# Patient Record
Sex: FEMALE | Race: WHITE | NOT HISPANIC OR LATINO | Employment: OTHER | URBAN - METROPOLITAN AREA
[De-identification: names, ages, dates, MRNs, and addresses within clinical notes are randomized per-mention and may not be internally consistent; named-entity substitution may affect disease eponyms.]

---

## 2017-12-05 ENCOUNTER — HOSPITAL ENCOUNTER (OUTPATIENT)
Facility: CLINIC | Age: 72
End: 2017-12-05
Attending: COLON & RECTAL SURGERY | Admitting: COLON & RECTAL SURGERY
Payer: MEDICARE

## 2017-12-19 RX ORDER — ONDANSETRON 2 MG/ML
4 INJECTION INTRAMUSCULAR; INTRAVENOUS
Status: CANCELLED | OUTPATIENT
Start: 2017-12-19

## 2017-12-19 RX ORDER — LIDOCAINE 40 MG/G
CREAM TOPICAL
Status: CANCELLED | OUTPATIENT
Start: 2017-12-19

## 2019-02-18 ENCOUNTER — APPOINTMENT (OUTPATIENT)
Age: 74
Setting detail: DERMATOLOGY
End: 2019-03-04

## 2019-02-18 DIAGNOSIS — Z41.9 ENCOUNTER FOR PROCEDURE FOR PURPOSES OTHER THAN REMEDYING HEALTH STATE, UNSPECIFIED: ICD-10-CM

## 2019-02-18 PROCEDURE — OTHER COSMETIC CONSULTATION: BROWN SPOTS: OTHER

## 2019-02-18 ASSESSMENT — LOCATION ZONE DERM: LOCATION ZONE: FACE

## 2019-02-18 ASSESSMENT — LOCATION SIMPLE DESCRIPTION DERM: LOCATION SIMPLE: LEFT CHEEK

## 2019-02-18 ASSESSMENT — LOCATION DETAILED DESCRIPTION DERM: LOCATION DETAILED: LEFT CENTRAL MALAR CHEEK

## 2021-03-22 DIAGNOSIS — Z11.59 ENCOUNTER FOR SCREENING FOR OTHER VIRAL DISEASES: ICD-10-CM

## 2021-04-06 ENCOUNTER — HOSPITAL ENCOUNTER (OUTPATIENT)
Facility: CLINIC | Age: 76
Discharge: HOME | End: 2021-04-06
Attending: INTERNAL MEDICINE | Admitting: INTERNAL MEDICINE
Payer: COMMERCIAL

## 2021-04-06 ENCOUNTER — ANESTHESIA (OUTPATIENT)
Dept: GASTROENTEROLOGY | Facility: CLINIC | Age: 76
End: 2021-04-06
Payer: COMMERCIAL

## 2021-04-06 ENCOUNTER — ANESTHESIA EVENT (OUTPATIENT)
Dept: GASTROENTEROLOGY | Facility: CLINIC | Age: 76
End: 2021-04-06
Payer: COMMERCIAL

## 2021-04-06 VITALS
HEART RATE: 48 BPM | BODY MASS INDEX: 24.99 KG/M2 | SYSTOLIC BLOOD PRESSURE: 154 MMHG | DIASTOLIC BLOOD PRESSURE: 74 MMHG | HEIGHT: 65 IN | OXYGEN SATURATION: 99 % | RESPIRATION RATE: 16 BRPM | WEIGHT: 150 LBS

## 2021-04-06 DIAGNOSIS — K86.2 PANCREAS CYST: Primary | ICD-10-CM

## 2021-04-06 LAB
AMYLASE FLD-CCNC: NORMAL U/L
CEA FLD-MCNC: 130.2 UG/L
CEA FLD-MCNC: NORMAL NG/ML
UPPER EUS: NORMAL

## 2021-04-06 PROCEDURE — 43242 EGD US FINE NEEDLE BX/ASPIR: CPT | Performed by: INTERNAL MEDICINE

## 2021-04-06 PROCEDURE — 258N000003 HC RX IP 258 OP 636: Performed by: NURSE ANESTHETIST, CERTIFIED REGISTERED

## 2021-04-06 PROCEDURE — 250N000011 HC RX IP 250 OP 636: Performed by: INTERNAL MEDICINE

## 2021-04-06 PROCEDURE — 88108 CYTOPATH CONCENTRATE TECH: CPT | Mod: 26 | Performed by: PATHOLOGY

## 2021-04-06 PROCEDURE — 88313 SPECIAL STAINS GROUP 2: CPT | Mod: 26 | Performed by: PATHOLOGY

## 2021-04-06 PROCEDURE — 250N000009 HC RX 250: Performed by: NURSE ANESTHETIST, CERTIFIED REGISTERED

## 2021-04-06 PROCEDURE — 88313 SPECIAL STAINS GROUP 2: CPT | Mod: TC | Performed by: INTERNAL MEDICINE

## 2021-04-06 PROCEDURE — 88108 CYTOPATH CONCENTRATE TECH: CPT | Mod: TC | Performed by: INTERNAL MEDICINE

## 2021-04-06 PROCEDURE — 250N000011 HC RX IP 250 OP 636: Performed by: NURSE ANESTHETIST, CERTIFIED REGISTERED

## 2021-04-06 PROCEDURE — 370N000017 HC ANESTHESIA TECHNICAL FEE, PER MIN: Performed by: INTERNAL MEDICINE

## 2021-04-06 PROCEDURE — 82150 ASSAY OF AMYLASE: CPT | Performed by: INTERNAL MEDICINE

## 2021-04-06 PROCEDURE — 999N000010 HC STATISTIC ANES STAT CODE-CRNA PER MINUTE: Performed by: INTERNAL MEDICINE

## 2021-04-06 PROCEDURE — 82378 CARCINOEMBRYONIC ANTIGEN: CPT | Performed by: INTERNAL MEDICINE

## 2021-04-06 RX ORDER — FENTANYL CITRATE 50 UG/ML
25-50 INJECTION, SOLUTION INTRAMUSCULAR; INTRAVENOUS
Status: DISCONTINUED | OUTPATIENT
Start: 2021-04-06 | End: 2021-04-06 | Stop reason: HOSPADM

## 2021-04-06 RX ORDER — MEPERIDINE HYDROCHLORIDE 25 MG/ML
12.5 INJECTION INTRAMUSCULAR; INTRAVENOUS; SUBCUTANEOUS
Status: DISCONTINUED | OUTPATIENT
Start: 2021-04-06 | End: 2021-04-06 | Stop reason: HOSPADM

## 2021-04-06 RX ORDER — HYDROMORPHONE HYDROCHLORIDE 1 MG/ML
.3-.5 INJECTION, SOLUTION INTRAMUSCULAR; INTRAVENOUS; SUBCUTANEOUS EVERY 10 MIN PRN
Status: DISCONTINUED | OUTPATIENT
Start: 2021-04-06 | End: 2021-04-06 | Stop reason: HOSPADM

## 2021-04-06 RX ORDER — SODIUM CHLORIDE, SODIUM LACTATE, POTASSIUM CHLORIDE, CALCIUM CHLORIDE 600; 310; 30; 20 MG/100ML; MG/100ML; MG/100ML; MG/100ML
INJECTION, SOLUTION INTRAVENOUS CONTINUOUS
Status: DISCONTINUED | OUTPATIENT
Start: 2021-04-06 | End: 2021-04-06 | Stop reason: HOSPADM

## 2021-04-06 RX ORDER — NALOXONE HYDROCHLORIDE 0.4 MG/ML
0.2 INJECTION, SOLUTION INTRAMUSCULAR; INTRAVENOUS; SUBCUTANEOUS
Status: DISCONTINUED | OUTPATIENT
Start: 2021-04-06 | End: 2021-04-06 | Stop reason: HOSPADM

## 2021-04-06 RX ORDER — LIDOCAINE 40 MG/G
CREAM TOPICAL
Status: DISCONTINUED | OUTPATIENT
Start: 2021-04-06 | End: 2021-04-06 | Stop reason: HOSPADM

## 2021-04-06 RX ORDER — LIDOCAINE HYDROCHLORIDE 20 MG/ML
INJECTION, SOLUTION INFILTRATION; PERINEURAL PRN
Status: DISCONTINUED | OUTPATIENT
Start: 2021-04-06 | End: 2021-04-06

## 2021-04-06 RX ORDER — LOSARTAN POTASSIUM AND HYDROCHLOROTHIAZIDE 12.5; 5 MG/1; MG/1
TABLET ORAL
COMMUNITY
Start: 2021-03-31

## 2021-04-06 RX ORDER — IVERMECTIN 3 MG/1
4 TABLET ORAL
COMMUNITY
Start: 2021-01-07 | End: 2021-08-03

## 2021-04-06 RX ORDER — NALOXONE HYDROCHLORIDE 0.4 MG/ML
0.4 INJECTION, SOLUTION INTRAMUSCULAR; INTRAVENOUS; SUBCUTANEOUS
Status: DISCONTINUED | OUTPATIENT
Start: 2021-04-06 | End: 2021-04-06 | Stop reason: HOSPADM

## 2021-04-06 RX ORDER — ONDANSETRON 2 MG/ML
4 INJECTION INTRAMUSCULAR; INTRAVENOUS EVERY 30 MIN PRN
Status: DISCONTINUED | OUTPATIENT
Start: 2021-04-06 | End: 2021-04-06 | Stop reason: HOSPADM

## 2021-04-06 RX ORDER — NITROFURANTOIN 25; 75 MG/1; MG/1
CAPSULE ORAL
COMMUNITY
Start: 2021-04-04

## 2021-04-06 RX ORDER — CYANOCOBALAMIN 1000 UG/ML
1000 INJECTION, SOLUTION INTRAMUSCULAR; SUBCUTANEOUS
COMMUNITY
Start: 2019-07-17

## 2021-04-06 RX ORDER — CIPROFLOXACIN 250 MG/1
250 TABLET, FILM COATED ORAL 2 TIMES DAILY
Qty: 10 TABLET | Refills: 0 | Status: SHIPPED | OUTPATIENT
Start: 2021-04-06 | End: 2021-08-03

## 2021-04-06 RX ORDER — LEVOTHYROXINE SODIUM 88 UG/1
88 TABLET ORAL
COMMUNITY
Start: 2020-12-07

## 2021-04-06 RX ORDER — ROSUVASTATIN CALCIUM 10 MG/1
10 TABLET, COATED ORAL
COMMUNITY
Start: 2020-09-21

## 2021-04-06 RX ORDER — AMLODIPINE BESYLATE 2.5 MG/1
1.25 TABLET ORAL
COMMUNITY
Start: 2019-07-10

## 2021-04-06 RX ORDER — SODIUM CHLORIDE, SODIUM LACTATE, POTASSIUM CHLORIDE, CALCIUM CHLORIDE 600; 310; 30; 20 MG/100ML; MG/100ML; MG/100ML; MG/100ML
INJECTION, SOLUTION INTRAVENOUS CONTINUOUS PRN
Status: DISCONTINUED | OUTPATIENT
Start: 2021-04-06 | End: 2021-04-06

## 2021-04-06 RX ORDER — ONDANSETRON 2 MG/ML
INJECTION INTRAMUSCULAR; INTRAVENOUS PRN
Status: DISCONTINUED | OUTPATIENT
Start: 2021-04-06 | End: 2021-04-06

## 2021-04-06 RX ORDER — FLUMAZENIL 0.1 MG/ML
0.2 INJECTION, SOLUTION INTRAVENOUS
Status: DISCONTINUED | OUTPATIENT
Start: 2021-04-06 | End: 2021-04-06 | Stop reason: HOSPADM

## 2021-04-06 RX ORDER — CIPROFLOXACIN 2 MG/ML
400 INJECTION, SOLUTION INTRAVENOUS ONCE
Status: COMPLETED | OUTPATIENT
Start: 2021-04-06 | End: 2021-04-06

## 2021-04-06 RX ORDER — PROPOFOL 10 MG/ML
INJECTION, EMULSION INTRAVENOUS CONTINUOUS PRN
Status: DISCONTINUED | OUTPATIENT
Start: 2021-04-06 | End: 2021-04-06

## 2021-04-06 RX ORDER — ONDANSETRON 4 MG/1
4 TABLET, ORALLY DISINTEGRATING ORAL EVERY 30 MIN PRN
Status: DISCONTINUED | OUTPATIENT
Start: 2021-04-06 | End: 2021-04-06 | Stop reason: HOSPADM

## 2021-04-06 RX ORDER — PROPOFOL 10 MG/ML
INJECTION, EMULSION INTRAVENOUS PRN
Status: DISCONTINUED | OUTPATIENT
Start: 2021-04-06 | End: 2021-04-06

## 2021-04-06 RX ADMIN — PROPOFOL 30 MG: 10 INJECTION, EMULSION INTRAVENOUS at 10:21

## 2021-04-06 RX ADMIN — BENZOCAINE 1 SPRAY: 200 SOLUTION TOPICAL at 10:17

## 2021-04-06 RX ADMIN — PROPOFOL 125 MCG/KG/MIN: 10 INJECTION, EMULSION INTRAVENOUS at 10:15

## 2021-04-06 RX ADMIN — CIPROFLOXACIN 400 MG: 2 INJECTION INTRAVENOUS at 10:15

## 2021-04-06 RX ADMIN — SODIUM CHLORIDE, POTASSIUM CHLORIDE, SODIUM LACTATE AND CALCIUM CHLORIDE: 600; 310; 30; 20 INJECTION, SOLUTION INTRAVENOUS at 10:15

## 2021-04-06 RX ADMIN — LIDOCAINE HYDROCHLORIDE 30 MG: 20 INJECTION, SOLUTION INFILTRATION; PERINEURAL at 10:15

## 2021-04-06 RX ADMIN — LIDOCAINE HYDROCHLORIDE 30 MG: 20 INJECTION, SOLUTION INFILTRATION; PERINEURAL at 10:23

## 2021-04-06 RX ADMIN — ONDANSETRON 4 MG: 2 INJECTION INTRAMUSCULAR; INTRAVENOUS at 10:25

## 2021-04-06 ASSESSMENT — LIFESTYLE VARIABLES: TOBACCO_USE: 0

## 2021-04-06 ASSESSMENT — ENCOUNTER SYMPTOMS
ORTHOPNEA: 0
DYSRHYTHMIAS: 0
SEIZURES: 0

## 2021-04-06 ASSESSMENT — COPD QUESTIONNAIRES: COPD: 0

## 2021-04-06 ASSESSMENT — MIFFLIN-ST. JEOR: SCORE: 1186.28

## 2021-04-06 NOTE — ANESTHESIA CARE TRANSFER NOTE
Patient: Estiven Suarez    Procedure(s):  ESOPHAGOGASTRODUODENOSCOPY,, WITH ENDOSCOPIC ULTRASOUND GUIDANCE and  WITH FINE NEEDLE ASPIRATION BIOPSY    Diagnosis: Cyst of pancreas [K86.2]  Diagnosis Additional Information: No value filed.    Anesthesia Type:   MAC     Note:    Oropharynx: oropharynx clear of all foreign objects and spontaneously breathing  Level of Consciousness: drowsy  Oxygen Supplementation: room air    Independent Airway: airway patency satisfactory and stable  Dentition: dentition unchanged  Vital Signs Stable: post-procedure vital signs reviewed and stable  Report to RN Given: handoff report given  Patient transferred to: Phase II (endo phase II)    Handoff Report: Identifed the Patient, Identified the Reponsible Provider, Reviewed the pertinent medical history, Discussed the surgical course, Reviewed Intra-OP anesthesia mangement and issues during anesthesia, Set expectations for post-procedure period and Allowed opportunity for questions and acknowledgement of understanding      Vitals: (Last set prior to Anesthesia Care Transfer)  CRNA VITALS  4/6/2021 1004 - 4/6/2021 1039      4/6/2021             Resp Rate (set):  10        Electronically Signed By: NARCISA Euceda CRNA  April 6, 2021  10:39 AM

## 2021-04-06 NOTE — ANESTHESIA PREPROCEDURE EVALUATION
Anesthesia Pre-Procedure Evaluation    Patient: Estiven Suarez   MRN: [id number] : 1945        Preoperative Diagnosis: Cyst of pancreas [K86.2]   Procedure : Procedure(s):  ESOPHAGOGASTRODUODENOSCOPY,, WITH ENDOSCOPIC ULTRASOUND GUIDANCE and  WITH FINE NEEDLE ASPIRATION BIOPSY     Past Medical History:   Diagnosis Date     Thyroid disease       Past Surgical History:   Procedure Laterality Date     APPENDECTOMY        No Known Allergies   Social History     Tobacco Use     Smoking status: Never Smoker     Smokeless tobacco: Never Used   Substance Use Topics     Alcohol use: Not on file     Comment: occ      Wt Readings from Last 1 Encounters:   21 68 kg (150 lb)        Prior to Admission medications    Medication Sig Start Date End Date Taking? Authorizing Provider   amLODIPine (NORVASC) 2.5 MG tablet Take 1.25 mg by mouth  7/10/19  Yes Reported, Patient   cyanocobalamin (CYANOCOBALAMIN) 1000 MCG/ML injection Inject 1,000 mcg into the muscle 19  Yes Reported, Patient   ivermectin (STROMECTOL) 3 MG TABS tablet Take 4 tablets by mouth 21  Yes Reported, Patient   levothyroxine (SYNTHROID/LEVOTHROID) 88 MCG tablet Take 88 mcg by mouth 20  Yes Reported, Patient   losartan-hydrochlorothiazide (HYZAAR) 50-12.5 MG tablet Take half tab QD. 3/31/21  Yes Reported, Patient   nitroFURantoin macrocrystal-monohydrate (MACROBID) 100 MG capsule  21  Yes Reported, Patient   rosuvastatin (CRESTOR) 10 MG tablet Take 10 mg by mouth 20  Yes Reported, Patient     No current Epic-ordered facility-administered medications on file.      No current [facility]-ordered outpatient medications on file.     Anesthesia Evaluation   Pt has had prior anesthetic. Type: General.    No history of anesthetic complications       ROS/MED HX  ENT/Pulmonary:    (-) tobacco use, asthma, COPD, sleep apnea and recent URI   Neurologic:    (-) no seizures and no CVA   Cardiovascular:     (+) Dyslipidemia hypertension-----Previous  cardiac testing   Echo: Date: 2021 Results:  Stress Echo   1. Maximum stress test with 96.3% of age predicted maximum heart rate achieved.   2. During stress exam the patient developed no significant symptoms.   3. There were no ischemic changes by EKG during stress.   4. Post stress, decreased left ventricular size, increased global systolic function with an estimated EF of >75%.   5. Negative stress echo for ischemia.   6. At rest there is normal left ventricular size and systolic function.   7. The aortic valve is trileaflet and sclerotic, no stenosis and trivial regurgitation.  Stress Test: Date: Results:    ECG Reviewed: Date: Results:    Cath: Date: Results:   (-) angina, CAD, CHF, INFANTE, orthopnea/PND, syncope, arrhythmias, irregular heartbeat/palpitations, valvular problems/murmurs, angina, murmur and wheezes   METS/Exercise Tolerance: >4 METS    Hematologic:    (-) history of blood transfusion   Musculoskeletal:       GI/Hepatic:    (-) GERD and liver disease   Renal/Genitourinary:    (-) renal disease   Endo:     (+) thyroid problem, hypothyroidism,  (-) Type II DM and chronic steroid usage   Psychiatric/Substance Use:    (-) alcohol abuse history   Infectious Disease:       Malignancy:       Other:            Physical Exam    Airway        Mallampati: II   TM distance: > 3 FB   Neck ROM: full   Mouth opening: > 3 cm    Respiratory Devices and Support         Dental  no notable dental history         Cardiovascular          Rhythm and rate: regular and normal (-) no murmur    Pulmonary           breath sounds clear to auscultation   (-) no rhonchi and no wheezes        OUTSIDE LABS:  CBC:   Lab Results   Component Value Date    WBC 3.9 (L) 04/24/2009    HGB 12.1 04/24/2009    HCT 34.8 (L) 04/24/2009     04/24/2009     BMP:   Lab Results   Component Value Date     04/24/2009    POTASSIUM 3.9 04/24/2009    CHLORIDE 101 04/24/2009    CO2 26 04/24/2009    BUN 20 04/24/2009    CR 0.91 04/24/2009     GLC 99 04/24/2009     COAGS: No results found for: PTT, INR, FIBR  POC: No results found for: BGM, HCG, HCGS  HEPATIC: No results found for: ALBUMIN, PROTTOTAL, ALT, AST, GGT, ALKPHOS, BILITOTAL, BILIDIRECT, FAMILIA  OTHER:   Lab Results   Component Value Date    BELKYS 9.2 04/24/2009       Anesthesia Plan    ASA Status:  2   NPO Status:  NPO Appropriate    Anesthesia Type: MAC.     - Reason for MAC: straight local not clinically adequate              Consents    Anesthesia Plan(s) and associated risks, benefits, and realistic alternatives discussed. Questions answered and patient/representative(s) expressed understanding.     - Discussed with:  Patient         Postoperative Care    Pain management: IV analgesics.   PONV prophylaxis: Ondansetron (or other 5HT-3), Background Propofol Infusion     Comments:         H&P reviewed: Unable to attach H&P to encounter due to EHR limitations. H&P Update: appropriate H&P reviewed, patient examined. No interval changes since H&P (within 30 days).         Gerardo Denny MD

## 2021-04-07 NOTE — ANESTHESIA POSTPROCEDURE EVALUATION
Patient: Estiven Suarez    Procedure(s):  ESOPHAGOGASTRODUODENOSCOPY,, WITH ENDOSCOPIC ULTRASOUND GUIDANCE and  WITH FINE NEEDLE ASPIRATION BIOPSY    Diagnosis:Cyst of pancreas [K86.2]  Diagnosis Additional Information: No value filed.    Anesthesia Type:  MAC    Note:  Disposition: Outpatient   Postop Pain Control: Uneventful            Sign Out: Well controlled pain   PONV: No   Neuro/Psych: Uneventful            Sign Out: Acceptable/Baseline neuro status   Airway/Respiratory: Uneventful            Sign Out: Acceptable/Baseline resp. status   CV/Hemodynamics: Uneventful            Sign Out: Acceptable CV status   Other NRE: NONE   DID A NON-ROUTINE EVENT OCCUR? No    Event details/Postop Comments:  Pt doing well prior to discharge home.           Last vitals:  Vitals:    04/06/21 1050 04/06/21 1100 04/06/21 1110   BP: (!) 140/79 (!) 152/77 (!) 154/74   Pulse: (!) 49 (!) 48 (!) 48   Resp: 16 16 16   SpO2: 97% 96% 99%       Last vitals prior to Anesthesia Care Transfer:  CRNA VITALS  4/6/2021 1004 - 4/6/2021 1104      4/6/2021             Resp Rate (observed):  16    Resp Rate (set):  10          Electronically Signed By: Gerardo Denny MD  April 6, 2021  10:56 PM

## 2021-04-08 LAB — COPATH REPORT: NORMAL

## 2021-08-03 ENCOUNTER — OFFICE VISIT (OUTPATIENT)
Dept: VASCULAR SURGERY | Facility: CLINIC | Age: 76
End: 2021-08-03
Payer: COMMERCIAL

## 2021-08-03 ENCOUNTER — ANCILLARY PROCEDURE (OUTPATIENT)
Dept: ULTRASOUND IMAGING | Facility: CLINIC | Age: 76
End: 2021-08-03
Attending: SURGERY
Payer: COMMERCIAL

## 2021-08-03 DIAGNOSIS — I83.811 VARICOSE VEINS OF RIGHT LOWER EXTREMITY WITH PAIN: ICD-10-CM

## 2021-08-03 DIAGNOSIS — I83.811 VARICOSE VEINS OF RIGHT LOWER EXTREMITY WITH PAIN: Primary | ICD-10-CM

## 2021-08-03 PROCEDURE — 99203 OFFICE O/P NEW LOW 30 MIN: CPT | Performed by: SURGERY

## 2021-08-03 PROCEDURE — 93971 EXTREMITY STUDY: CPT | Mod: RT | Performed by: SURGERY

## 2021-08-03 NOTE — PROGRESS NOTES
VEINSOLUTIONS CONSULTATION    ASSESSMENT:  Right leg pain and varicose veins.  She has known degenerative arthritis of her knee which is undoubtedly contributing to to some of her symptoms.  This was discussed frankly with her.  She has sizable varicose veins coursing down the anterior aspect of the knee, making it more painful for her to kneel and, according to the patient, patient more difficult for her to be active.    We discussed the anatomy and pathophysiology of the lower extremity venous system and venous insufficiency.  The option of continued conservative measures with compression hose, leg elevation, dietary measures and exercise was discussed.  She has worn compression hose off and on for years and has not found adequate relief of her symptoms.  The option of continued conservative management with risk of superficial thrombophlebitis, bleeding and progression of the disease process were discussed.    Treatment options will be decided based on a right lower extremity venous competency study.  The patient wishes to pursue this.    We briefly discussed treatment options for superficial venous insufficiency using endovenous ablation for axial incompetence and either phlebectomy or sclerotherapy for treatment of branch tributaries.  Details of each including risks of bleeding, infection, nerve injury, scarring, hyperpigmentation, deep vein thrombosis, recanalization of the ablated veins and recurrent varicose veins were discussed.  She voiced understanding and her questions were answered.    PLAN:  Right lower extremity venous competency study with video visit to discuss results     HPI:    Estiven Suarez is a pleasant 75 year old female who presents with complaints of right lower extremity pain and varicose veins.  Varicose veins have been present for years. The pain is described as throbbing, worse when she is on her feet for long periods of time and in warm weather, improving with elevation of the leg and  ibuprofen on an as-needed basis.    She has no history of deep vein thrombosis, superficial thrombophlebitis or hemorrhage.    Her family history is significant for varicose veins    PAST MEDICAL HISTORY:   Past Medical History:   Diagnosis Date     Thyroid disease        PAST SURGICAL HISTORY:   Past Surgical History:   Procedure Laterality Date     APPENDECTOMY       ESOPHAGOSCOPY, GASTROSCOPY, DUODENOSCOPY (EGD), COMBINED N/A 4/6/2021    Procedure: ESOPHAGOGASTRODUODENOSCOPY,, WITH ENDOSCOPIC ULTRASOUND GUIDANCE and  WITH FINE NEEDLE ASPIRATION BIOPSY;  Surgeon: Kirstin Byrne MD;  Location:  GI       FAMILY HISTORY:   Family History   Problem Relation Age of Onset     Lymphoma Mother        SOCIAL HISTORY:   Social History     Tobacco Use     Smoking status: Never Smoker     Smokeless tobacco: Never Used   Substance Use Topics     Alcohol use: Not on file     Comment: occ       REVIEW OF SYSTEMS: Review Of Systems  Skin: negative  Eyes: negative  Ears/Nose/Throat: negative  Respiratory: No shortness of breath, dyspnea on exertion, cough, or hemoptysis  Cardiovascular: negative  Gastrointestinal: negative  Genitourinary: negative  Musculoskeletal: Arthritis of knees, leg swelling  Neurologic: negative  Psychiatric: negative  Hematologic/Lymphatic/Immunologic: negative  Endocrine: Hypothyroidism      Vital signs:  There were no vitals taken for this visit.    Current Outpatient Medications   Medication Sig Dispense Refill     amLODIPine (NORVASC) 2.5 MG tablet Take 1.25 mg by mouth        cyanocobalamin (CYANOCOBALAMIN) 1000 MCG/ML injection Inject 1,000 mcg into the muscle       levothyroxine (SYNTHROID/LEVOTHROID) 88 MCG tablet Take 88 mcg by mouth       losartan-hydrochlorothiazide (HYZAAR) 50-12.5 MG tablet Take half tab QD.       nitroFURantoin macrocrystal-monohydrate (MACROBID) 100 MG capsule        rosuvastatin (CRESTOR) 10 MG tablet Take 10 mg by mouth         PHYSICAL EXAM:  General:  Jazmine, NAD.   HEENT: Normocephalic, atraumatic, external ears and nose normal.   Respiratory: Normal respiratory effort.   Cardiovascular: Pulse is regular.   Musculoskeletal: Gait and station normal.  The joints of her fingers and toes without deformity.  There is no cyanosis of her nailbeds.   EXTREMITIES: Right lower extremity: 4 to 6 mm varicosity coursing down the distal medial right thigh, anteriorly and distally across the right patella and onto the proximal anterior right leg.  No significant stasis hyperpigmentation or edema..    PULSES: R/L (3=normal pulse, 0=no palpable pulse) dorsalis pedis: 3/3; posterior tibial: 3/3.      Neurologic: Grossly normal  Psychiatric: Mood, affect, judgment and insight are normal     Clay Sanchez MD    Dictated using Dragon voice recognition software which may result in transcription errors    VEINSOLUTIONS NEW PATIENT:

## 2021-08-03 NOTE — LETTER
8/3/2021         RE: Estiven Suarez  [address], [place], operated by [facility] [address]        Dear Colleague,    Thank you for referring your patient, Estiven Suarez, to the [facility] [place]. Please see a copy of my visit note below.    VEINSOLUTIONS CONSULTATION    ASSESSMENT:  Right leg pain and varicose veins.  She has known degenerative arthritis of her knee which is undoubtedly contributing to to some of her symptoms.  This was discussed frankly with her.  She has sizable varicose veins coursing down the anterior aspect of the knee, making it more painful for her to kneel and, according to the patient, patient more difficult for her to be active.    We discussed the anatomy and pathophysiology of the lower extremity venous system and venous insufficiency.  The option of continued conservative measures with compression hose, leg elevation, dietary measures and exercise was discussed.  She has worn compression hose off and on for years and has not found adequate relief of her symptoms.  The option of continued conservative management with risk of superficial thrombophlebitis, bleeding and progression of the disease process were discussed.    Treatment options will be decided based on a right lower extremity venous competency study.  The patient wishes to pursue this.    We briefly discussed treatment options for superficial venous insufficiency using endovenous ablation for axial incompetence and either phlebectomy or sclerotherapy for treatment of branch tributaries.  Details of each including risks of bleeding, infection, nerve injury, scarring, hyperpigmentation, deep vein thrombosis, recanalization of the ablated veins and recurrent varicose veins were discussed.  She voiced understanding and her questions were answered.    PLAN:  Right lower extremity venous competency study with video visit to discuss results     HPI:    Estiven Suarez is a pleasant 75 year old female who presents with  complaints of right lower extremity pain and varicose veins.  Varicose veins have been present for years. The pain is described as throbbing, worse when she is on her feet for long periods of time and in warm weather, improving with elevation of the leg and ibuprofen on an as-needed basis.    She has no history of deep vein thrombosis, superficial thrombophlebitis or hemorrhage.    Her family history is significant for varicose veins    PAST MEDICAL HISTORY:   Past Medical History:   Diagnosis Date     Thyroid disease        PAST SURGICAL HISTORY:   Past Surgical History:   Procedure Laterality Date     APPENDECTOMY       ESOPHAGOSCOPY, GASTROSCOPY, DUODENOSCOPY (EGD), COMBINED N/A 4/6/2021    Procedure: ESOPHAGOGASTRODUODENOSCOPY,, WITH ENDOSCOPIC ULTRASOUND GUIDANCE and  WITH FINE NEEDLE ASPIRATION BIOPSY;  Surgeon: Kirstin Byrne MD;  Location: [facility]       FAMILY HISTORY:   Family History   Problem Relation Age of Onset     Lymphoma Mother        SOCIAL HISTORY:   Social History     Tobacco Use     Smoking status: Never Smoker     Smokeless tobacco: Never Used   Substance Use Topics     Alcohol use: Not on file     Comment: occ       REVIEW OF SYSTEMS: Review Of Systems  Skin: negative  Eyes: negative  Ears/Nose/Throat: negative  Respiratory: No shortness of breath, dyspnea on exertion, cough, or hemoptysis  Cardiovascular: negative  Gastrointestinal: negative  Genitourinary: negative  Musculoskeletal: Arthritis of knees, leg swelling  Neurologic: negative  Psychiatric: negative  Hematologic/Lymphatic/Immunologic: negative  Endocrine: Hypothyroidism      Vital signs:  There were no vitals taken for this visit.    Current Outpatient Medications   Medication Sig Dispense Refill     amLODIPine (NORVASC) 2.5 MG tablet Take 1.25 mg by mouth        cyanocobalamin (CYANOCOBALAMIN) 1000 MCG/ML injection Inject 1,000 mcg into the muscle       levothyroxine (SYNTHROID/LEVOTHROID) 88 MCG tablet Take 88 mcg by  mouth       losartan-hydrochlorothiazide (HYZAAR) 50-12.5 MG tablet Take half tab QD.       nitroFURantoin macrocrystal-monohydrate (MACROBID) 100 MG capsule        rosuvastatin (CRESTOR) 10 MG tablet Take 10 mg by mouth         PHYSICAL EXAM:  General: Pleasant, NAD.   HEENT: Normocephalic, atraumatic, external ears and nose normal.   Respiratory: Normal respiratory effort.   Cardiovascular: Pulse is regular.   Musculoskeletal: Gait and station normal.  The joints of her fingers and toes without deformity.  There is no cyanosis of her nailbeds.   EXTREMITIES: Right lower extremity: 4 to 6 mm varicosity coursing down the distal medial right thigh, anteriorly and distally across the right patella and onto the proximal anterior right leg.  No significant stasis hyperpigmentation or edema..    PULSES: R/L (3=normal pulse, 0=no palpable pulse) dorsalis pedis: 3/3; posterior tibial: 3/3.      Neurologic: Grossly normal  Psychiatric: Mood, affect, judgment and insight are normal     Clay Sanchez MD    Dictated using Dragon voice recognition software which may result in transcription errors    VEINSOLUTIONS NEW PATIENT:                  Again, thank you for allowing me to participate in the care of your patient.        Sincerely,        Clay Sanchez MD

## 2021-08-04 ENCOUNTER — VIRTUAL VISIT (OUTPATIENT)
Dept: VASCULAR SURGERY | Facility: CLINIC | Age: 76
End: 2021-08-04
Payer: COMMERCIAL

## 2021-08-04 DIAGNOSIS — I83.811 VARICOSE VEINS OF RIGHT LOWER EXTREMITY WITH PAIN: Primary | ICD-10-CM

## 2021-08-04 PROCEDURE — 99213 OFFICE O/P EST LOW 20 MIN: CPT | Mod: 95 | Performed by: SURGERY

## 2021-08-04 NOTE — PROGRESS NOTES
Estiven is a 75 year old who is being evaluated via a billable video visit.      If the video visit is dropped, the invitation should be resent by: Text to cell phone: [phone]  Will anyone else be joining your video visit? No    Video Start Time: 10:32 AM    Video-Visit Details    Type of service:  Video Visit    Video End Time:10:45 AM    Originating Location (pt. Location): Home    Distant Location (provider location):  [facility] [place]     Platform used for Video Visit: Sterecycle     [facility] [place] Progress Note    Estiven Suarez presents in follow-up of right lower extremity pain and varicose veins.  Please see my consultation of 8/3/2021 for details.  She returns today for a video visit to discuss her right lower extremity venous competency study.    Physical Exam  General: Pleasant female in no acute distress.  Blood pressure 142/81, pulse 68  Extremities: Right lower extremity: 4 to 6 mm varicosity coursing down the distal medial right thigh, anteriorly and distally across the patella and over the proximal anterior right leg.  No significant stasis hyperpigmentation or edema.    Ultrasound:  Right lower extremity: No evidence of deep vein thrombosis.  The right common femoral to popliteal veins are incompetent.  The right great saphenous vein is incompetent from the saphenofemoral junction to the mid thigh, measures 4.3 mm diameter with reflux time of 4.2 seconds.  It is a source of an incompetent vein branch measuring 3.2 mm in diameter in the distal thigh with reflux time of 4.5 seconds.  The right small saphenous vein is incompetent proximally but is otherwise competent.  The right anterior accessory saphenous vein is incompetent, measures 3.2 mm in diameter with reflux time of 2.6 seconds.  It is the source of incompetent vein branch measuring 5.3 mm in diameter with reflux time of 3.9 seconds.  The Vein of [place] is competent.  There are no significant  incompetent perforators.  There is a 5.1 x 2.3 x 1.0 fluid collection in the popliteal fossa consistent with a Baker's cyst.    Assessment:  Right leg pain with varicose veins.  She has both deep and superficial venous insufficiency contributing to the process.  We discussed the option of continued conservative management with compression, leg elevation, dietary measures and exercise.  We also discussed the fact that with knee arthritis, symptoms will not be fully alleviated with treatment of her venous insufficiency.    Risks of conservative management including superficial thrombophlebitis, bleeding and progression of the disease process were discussed.  If she chooses treatment, she be a candidate for endovenous ablation of the right anterior accessory saphenous vein, the major source of the varicose veins.  This could be performed with thermal and nonthermal techniques, both of which were discussed.  Risk of the treatment including deep vein thrombosis, superficial thrombophlebitis and glue hypersensitivity reaction were discussed.    After lengthy discussion, the patient prefers to have the cyanoacrylate glue ablation of her vein which will allow her quicker return to normal activities, obviate the need for oral sedation and tumescent anesthesia as well.  The tributary of the right anterior sensory saphenous vein will need an adjunctive procedure, either phlebectomies or sclerotherapy.  The patient prefers sclerotherapy to be performed at the same setting.  Risk of sclerotherapy including superficial thrombophlebitis, allergic reaction, deep antibiosis, hyperpigmentation were discussed.  She understands it will take several months for hyperpigmentation to resolve.    Plan:  Cyanoacrylate glue ablation right anterior accessory saphenous vein with medically necessary, ultrasound-guided sclerotherapy to be performed at the same setting.  She understands that a second session of sclerotherapy may be  necessary.    Clay Sanchez MD    Dictated using Dragon voice recognition software which may result in transcription errors

## 2021-08-04 NOTE — LETTER
8/4/2021         RE: Estiven Suarez  [address] [facility] [address]        Dear Colleague,    Thank you for referring your patient, Estiven Suarez, to the [facility] [place]. Please see a copy of my visit note below.    Estiven is a 75 year old who is being evaluated via a billable video visit.      If the video visit is dropped, the invitation should be resent by: Text to cell phone: [phone]  Will anyone else be joining your video visit? No    Video Start Time: 10:32 AM    Video-Visit Details    Type of service:  Video Visit    Video End Time:10:45 AM    Originating Location (pt. Location): Home    Distant Location (provider location):  [facility]     Platform used for Video Visit: ReCellular     [facility] [place] Progress Note    Estiven Suarez presents in follow-up of right lower extremity pain and varicose veins.  Please see my consultation of 8/3/2021 for details.  She returns today for a video visit to discuss her right lower extremity venous competency study.    Physical Exam  General: Pleasant female in no acute distress.  Blood pressure 142/81, pulse 68  Extremities: Right lower extremity: 4 to 6 mm varicosity coursing down the distal medial right thigh, anteriorly and distally across the patella and over the proximal anterior right leg.  No significant stasis hyperpigmentation or edema.    Ultrasound:  Right lower extremity: No evidence of deep vein thrombosis.  The right common femoral to popliteal veins are incompetent.  The right great saphenous vein is incompetent from the saphenofemoral junction to the mid thigh, measures 4.3 mm diameter with reflux time of 4.2 seconds.  It is a source of an incompetent vein branch measuring 3.2 mm in diameter in the distal thigh with reflux time of 4.5 seconds.  The right small saphenous vein is incompetent proximally but is otherwise competent.  The right anterior accessory saphenous vein  is incompetent, measures 3.2 mm in diameter with reflux time of 2.6 seconds.  It is the source of incompetent vein branch measuring 5.3 mm in diameter with reflux time of 3.9 seconds.  The Vein of [place] is competent.  There are no significant incompetent perforators.  There is a 5.1 x 2.3 x 1.0 fluid collection in the popliteal fossa consistent with a Baker's cyst.    Assessment:  Right leg pain with varicose veins.  She has both deep and superficial venous insufficiency contributing to the process.  We discussed the option of continued conservative management with compression, leg elevation, dietary measures and exercise.  We also discussed the fact that with knee arthritis, symptoms will not be fully alleviated with treatment of her venous insufficiency.    Risks of conservative management including superficial thrombophlebitis, bleeding and progression of the disease process were discussed.  If she chooses treatment, she be a candidate for endovenous ablation of the right anterior accessory saphenous vein, the major source of the varicose veins.  This could be performed with thermal and nonthermal techniques, both of which were discussed.  Risk of the treatment including deep vein thrombosis, superficial thrombophlebitis and glue hypersensitivity reaction were discussed.    After lengthy discussion, the patient prefers to have the cyanoacrylate glue ablation of her vein which will allow her quicker return to normal activities, obviate the need for oral sedation and tumescent anesthesia as well.  The tributary of the right anterior sensory saphenous vein will need an adjunctive procedure, either phlebectomies or sclerotherapy.  The patient prefers sclerotherapy to be performed at the same setting.  Risk of sclerotherapy including superficial thrombophlebitis, allergic reaction, deep antibiosis, hyperpigmentation were discussed.  She understands it will take several months for hyperpigmentation to  resolve.    Plan:  Cyanoacrylate glue ablation right anterior accessory saphenous vein with medically necessary, ultrasound-guided sclerotherapy to be performed at the same setting.  She understands that a second session of sclerotherapy may be necessary.    Clay Sanchez MD    Dictated using Dragon voice recognition software which may result in transcription errors              Again, thank you for allowing me to participate in the care of your patient.        Sincerely,        Clay Sanchez MD

## 2021-08-11 ENCOUNTER — TELEPHONE (OUTPATIENT)
Dept: VASCULAR SURGERY | Facility: CLINIC | Age: 76
End: 2021-08-11

## 2021-08-11 NOTE — TELEPHONE ENCOUNTER
Called and LDVM regarding process of insurance submission. Informed patient this process could take up to 14 business days, but once approved, you will be contacted to schedule your procedure.    Further documentation of this process will be documented in the referral.    Olga Lidia Cobb  [facility]

## 2021-08-26 DIAGNOSIS — I83.811 VARICOSE VEINS OF RIGHT LOWER EXTREMITY WITH PAIN: Primary | ICD-10-CM

## 2021-10-26 ENCOUNTER — TELEPHONE (OUTPATIENT)
Dept: VASCULAR SURGERY | Facility: CLINIC | Age: 76
End: 2021-10-26

## 2021-10-27 NOTE — TELEPHONE ENCOUNTER
Called patient and she would like to wait until Jan to have the procedure.Pt will call back in Dec. To schedule.    Olga Lidia Cobb, Surgery Scheduler  [facility]

## 2022-03-15 DIAGNOSIS — Z11.59 ENCOUNTER FOR SCREENING FOR OTHER VIRAL DISEASES: Primary | ICD-10-CM

## 2022-03-25 ENCOUNTER — TRANSFERRED RECORDS (OUTPATIENT)
Dept: HEALTH INFORMATION MANAGEMENT | Facility: CLINIC | Age: 77
End: 2022-03-25

## 2022-03-28 ENCOUNTER — ANESTHESIA EVENT (OUTPATIENT)
Dept: GASTROENTEROLOGY | Facility: CLINIC | Age: 77
End: 2022-03-28
Payer: COMMERCIAL

## 2022-03-29 ENCOUNTER — ANESTHESIA (OUTPATIENT)
Dept: GASTROENTEROLOGY | Facility: CLINIC | Age: 77
End: 2022-03-29
Payer: COMMERCIAL

## 2022-03-29 ENCOUNTER — HOSPITAL ENCOUNTER (OUTPATIENT)
Facility: CLINIC | Age: 77
Discharge: HOME | End: 2022-03-29
Attending: INTERNAL MEDICINE | Admitting: INTERNAL MEDICINE
Payer: COMMERCIAL

## 2022-03-29 VITALS
HEIGHT: 65 IN | OXYGEN SATURATION: 97 % | TEMPERATURE: 97.2 F | SYSTOLIC BLOOD PRESSURE: 142 MMHG | WEIGHT: 160 LBS | BODY MASS INDEX: 26.66 KG/M2 | DIASTOLIC BLOOD PRESSURE: 100 MMHG | HEART RATE: 60 BPM | RESPIRATION RATE: 19 BRPM

## 2022-03-29 DIAGNOSIS — K86.2 PANCREAS CYST: Primary | ICD-10-CM

## 2022-03-29 LAB
AMYLASE FLD-CCNC: NORMAL U/L
CEA FLD-MCNC: 190 UG/L
UPPER EUS: NORMAL

## 2022-03-29 PROCEDURE — 370N000017 HC ANESTHESIA TECHNICAL FEE, PER MIN: Performed by: INTERNAL MEDICINE

## 2022-03-29 PROCEDURE — 258N000003 HC RX IP 258 OP 636: Performed by: REGISTERED NURSE

## 2022-03-29 PROCEDURE — 43242 EGD US FINE NEEDLE BX/ASPIR: CPT | Performed by: INTERNAL MEDICINE

## 2022-03-29 PROCEDURE — 43238 EGD US FINE NEEDLE BX/ASPIR: CPT | Performed by: INTERNAL MEDICINE

## 2022-03-29 PROCEDURE — 250N000011 HC RX IP 250 OP 636: Performed by: INTERNAL MEDICINE

## 2022-03-29 PROCEDURE — 82378 CARCINOEMBRYONIC ANTIGEN: CPT | Mod: GZ | Performed by: INTERNAL MEDICINE

## 2022-03-29 PROCEDURE — 250N000009 HC RX 250: Performed by: REGISTERED NURSE

## 2022-03-29 PROCEDURE — 250N000011 HC RX IP 250 OP 636: Performed by: REGISTERED NURSE

## 2022-03-29 PROCEDURE — 999N000010 HC STATISTIC ANES STAT CODE-CRNA PER MINUTE: Performed by: INTERNAL MEDICINE

## 2022-03-29 PROCEDURE — 88313 SPECIAL STAINS GROUP 2: CPT | Mod: TC | Performed by: INTERNAL MEDICINE

## 2022-03-29 RX ORDER — PROPOFOL 10 MG/ML
INJECTION, EMULSION INTRAVENOUS PRN
Status: DISCONTINUED | OUTPATIENT
Start: 2022-03-29 | End: 2022-03-29

## 2022-03-29 RX ORDER — SODIUM CHLORIDE, SODIUM LACTATE, POTASSIUM CHLORIDE, CALCIUM CHLORIDE 600; 310; 30; 20 MG/100ML; MG/100ML; MG/100ML; MG/100ML
INJECTION, SOLUTION INTRAVENOUS CONTINUOUS PRN
Status: DISCONTINUED | OUTPATIENT
Start: 2022-03-29 | End: 2022-03-29

## 2022-03-29 RX ORDER — CIPROFLOXACIN 250 MG/1
250 TABLET, FILM COATED ORAL 2 TIMES DAILY
Qty: 10 TABLET | Refills: 0 | Status: SHIPPED | OUTPATIENT
Start: 2022-03-29

## 2022-03-29 RX ORDER — NALOXONE HYDROCHLORIDE 0.4 MG/ML
0.4 INJECTION, SOLUTION INTRAMUSCULAR; INTRAVENOUS; SUBCUTANEOUS
Status: DISCONTINUED | OUTPATIENT
Start: 2022-03-29 | End: 2022-03-29 | Stop reason: HOSPADM

## 2022-03-29 RX ORDER — ONDANSETRON 2 MG/ML
INJECTION INTRAMUSCULAR; INTRAVENOUS PRN
Status: DISCONTINUED | OUTPATIENT
Start: 2022-03-29 | End: 2022-03-29

## 2022-03-29 RX ORDER — LIDOCAINE 40 MG/G
CREAM TOPICAL
Status: DISCONTINUED | OUTPATIENT
Start: 2022-03-29 | End: 2022-03-29 | Stop reason: HOSPADM

## 2022-03-29 RX ORDER — NALOXONE HYDROCHLORIDE 0.4 MG/ML
0.2 INJECTION, SOLUTION INTRAMUSCULAR; INTRAVENOUS; SUBCUTANEOUS
Status: DISCONTINUED | OUTPATIENT
Start: 2022-03-29 | End: 2022-03-29 | Stop reason: HOSPADM

## 2022-03-29 RX ORDER — LIDOCAINE HYDROCHLORIDE 20 MG/ML
INJECTION, SOLUTION INFILTRATION; PERINEURAL PRN
Status: DISCONTINUED | OUTPATIENT
Start: 2022-03-29 | End: 2022-03-29

## 2022-03-29 RX ORDER — ONDANSETRON 4 MG/1
4 TABLET, ORALLY DISINTEGRATING ORAL EVERY 6 HOURS PRN
Status: DISCONTINUED | OUTPATIENT
Start: 2022-03-29 | End: 2022-03-29 | Stop reason: HOSPADM

## 2022-03-29 RX ORDER — CIPROFLOXACIN 2 MG/ML
400 INJECTION, SOLUTION INTRAVENOUS ONCE
Status: COMPLETED | OUTPATIENT
Start: 2022-03-29 | End: 2022-03-29

## 2022-03-29 RX ORDER — DEXMEDETOMIDINE HYDROCHLORIDE 4 UG/ML
INJECTION, SOLUTION INTRAVENOUS PRN
Status: DISCONTINUED | OUTPATIENT
Start: 2022-03-29 | End: 2022-03-29

## 2022-03-29 RX ORDER — GLYCOPYRROLATE 0.2 MG/ML
INJECTION, SOLUTION INTRAMUSCULAR; INTRAVENOUS PRN
Status: DISCONTINUED | OUTPATIENT
Start: 2022-03-29 | End: 2022-03-29

## 2022-03-29 RX ORDER — PROPOFOL 10 MG/ML
INJECTION, EMULSION INTRAVENOUS CONTINUOUS PRN
Status: DISCONTINUED | OUTPATIENT
Start: 2022-03-29 | End: 2022-03-29

## 2022-03-29 RX ORDER — ONDANSETRON 2 MG/ML
4 INJECTION INTRAMUSCULAR; INTRAVENOUS EVERY 6 HOURS PRN
Status: DISCONTINUED | OUTPATIENT
Start: 2022-03-29 | End: 2022-03-29 | Stop reason: HOSPADM

## 2022-03-29 RX ORDER — FLUMAZENIL 0.1 MG/ML
0.2 INJECTION, SOLUTION INTRAVENOUS
Status: DISCONTINUED | OUTPATIENT
Start: 2022-03-29 | End: 2022-03-29 | Stop reason: HOSPADM

## 2022-03-29 RX ADMIN — LIDOCAINE HYDROCHLORIDE 50 MG: 20 INJECTION, SOLUTION INFILTRATION; PERINEURAL at 08:12

## 2022-03-29 RX ADMIN — CIPROFLOXACIN 400 MG: 2 INJECTION INTRAVENOUS at 08:08

## 2022-03-29 RX ADMIN — PROPOFOL 150 MCG/KG/MIN: 10 INJECTION, EMULSION INTRAVENOUS at 08:12

## 2022-03-29 RX ADMIN — TOPICAL ANESTHETIC 1 SPRAY: 200 SPRAY DENTAL; PERIODONTAL at 08:08

## 2022-03-29 RX ADMIN — SODIUM CHLORIDE, POTASSIUM CHLORIDE, SODIUM LACTATE AND CALCIUM CHLORIDE: 600; 310; 30; 20 INJECTION, SOLUTION INTRAVENOUS at 08:08

## 2022-03-29 RX ADMIN — PROPOFOL 30 MG: 10 INJECTION, EMULSION INTRAVENOUS at 08:19

## 2022-03-29 RX ADMIN — ONDANSETRON 4 MG: 2 INJECTION INTRAMUSCULAR; INTRAVENOUS at 08:13

## 2022-03-29 RX ADMIN — GLYCOPYRROLATE 0.2 MG: 0.2 INJECTION, SOLUTION INTRAMUSCULAR; INTRAVENOUS at 08:10

## 2022-03-29 RX ADMIN — PROPOFOL 30 MG: 10 INJECTION, EMULSION INTRAVENOUS at 08:13

## 2022-03-29 RX ADMIN — DEXMEDETOMIDINE 8 MCG: 100 INJECTION, SOLUTION, CONCENTRATE INTRAVENOUS at 08:18

## 2022-03-29 NOTE — ANESTHESIA POSTPROCEDURE EVALUATION
Patient: Estiven Suarez    Procedure: Procedure(s):  ESOPHAGOGASTRODUODENOSCOPY, WITH FINE NEEDLE ASPIRATION BIOPSY, WITH ENDOSCOPIC ULTRASOUND GUIDANCE       Anesthesia Type:  MAC    Note:  Disposition: Outpatient   Postop Pain Control: Uneventful            Sign Out: Well controlled pain   PONV: No   Neuro/Psych: Uneventful            Sign Out: Acceptable/Baseline neuro status   Airway/Respiratory: Uneventful            Sign Out: Acceptable/Baseline resp. status   CV/Hemodynamics: Uneventful            Sign Out: Acceptable CV status   Other NRE: NONE   DID A NON-ROUTINE EVENT OCCUR? No           Last vitals:  Vitals Value Taken Time   /80 03/29/22 0900   Temp     Pulse 57 03/29/22 0906   Resp 11 03/29/22 0906   SpO2 98 % 03/29/22 0906   Vitals shown include unvalidated device data.    Electronically Signed By: Minh Oconnor MD  March 29, 2022  9:07 AM

## 2022-03-29 NOTE — ANESTHESIA CARE TRANSFER NOTE
Patient: Estiven Suarez    Procedure: Procedure(s):  ESOPHAGOGASTRODUODENOSCOPY, WITH FINE NEEDLE ASPIRATION BIOPSY, WITH ENDOSCOPIC ULTRASOUND GUIDANCE       Diagnosis: Pancreatic cyst [K86.2]  Diagnosis Additional Information: No value filed.    Anesthesia Type:   MAC     Note:    Oropharynx: oropharynx clear of all foreign objects and spontaneously breathing  Level of Consciousness: drowsy  Oxygen Supplementation: nasal cannula  Level of Supplemental Oxygen (L/min / FiO2): 3  Independent Airway: airway patency satisfactory and stable  Dentition: dentition unchanged  Vital Signs Stable: post-procedure vital signs reviewed and stable  Report to RN Given: handoff report given  Patient transferred to: Phase II  Comments: At end of procedure, spontaneous respirations, patient alert to voice, able to follow commands. Oxygen via NC at 3 liters per minute to PACU. Oxygen tubing connected to wall O2 in PACU, SpO2, NiBP, and EKG monitors and alarms on and functioning, Theron Hugger warmer connected to patient gown, report on patient's clinical status given to PACU RN, RN questions answered.  Handoff Report: Identifed the Patient, Identified the Reponsible Provider, Reviewed the pertinent medical history, Discussed the surgical course, Reviewed Intra-OP anesthesia mangement and issues during anesthesia, Set expectations for post-procedure period and Allowed opportunity for questions and acknowledgement of understanding      Vitals:  Vitals Value Taken Time   /66 03/29/22 0839   Temp 98    Pulse 65 03/29/22 0841   Resp 16 03/29/22 0841   SpO2 96 % 03/29/22 0841   Vitals shown include unvalidated device data.    Electronically Signed By: NARCISA Jimenez CRNA  March 29, 2022  8:43 AM

## 2022-03-29 NOTE — ANESTHESIA PREPROCEDURE EVALUATION
Anesthesia Pre-Procedure Evaluation    Patient: Estiven Suarez   MRN: [id number] : 1945        Procedure : Procedure(s):  ESOPHAGOGASTRODUODENOSCOPY, WITH FINE NEEDLE ASPIRATION BIOPSY, WITH ENDOSCOPIC ULTRASOUND GUIDANCE          Past Medical History:   Diagnosis Date     Thyroid disease       Past Surgical History:   Procedure Laterality Date     APPENDECTOMY       ESOPHAGOSCOPY, GASTROSCOPY, DUODENOSCOPY (EGD), COMBINED N/A 2021    Procedure: ESOPHAGOGASTRODUODENOSCOPY,, WITH ENDOSCOPIC ULTRASOUND GUIDANCE and  WITH FINE NEEDLE ASPIRATION BIOPSY;  Surgeon: Kirstin Byrne MD;  Location:  GI      No Known Allergies   Social History     Tobacco Use     Smoking status: Never Smoker     Smokeless tobacco: Never Used   Substance Use Topics     Alcohol use: Not on file     Comment: occ      Wt Readings from Last 1 Encounters:   21 68 kg (150 lb)        Anesthesia Evaluation   Pt has had prior anesthetic.     No history of anesthetic complications       ROS/MED HX  ENT/Pulmonary: Comment: Chronic rhinitis      Neurologic:     (+) migraines,     Cardiovascular: Comment: Mild LAD stenosis.    (+) hypertension--CAD ---    METS/Exercise Tolerance:     Hematologic:  - neg hematologic  ROS     Musculoskeletal:  - neg musculoskeletal ROS     GI/Hepatic: Comment: H/o pancreatic cyst.      Renal/Genitourinary:  - neg Renal ROS     Endo: Comment: HLD.    (+) thyroid problem, hypothyroidism,     Psychiatric/Substance Use:  - neg psychiatric ROS     Infectious Disease:  - neg infectious disease ROS     Malignancy:  - neg malignancy ROS     Other:            Physical Exam    Airway  airway exam normal      Mallampati: II   TM distance: > 3 FB   Neck ROM: full   Mouth opening: > 3 cm    Respiratory Devices and Support         Dental  no notable dental history         Cardiovascular   cardiovascular exam normal          Pulmonary   pulmonary exam normal                OUTSIDE LABS:  CBC:   Lab Results    Component Value Date    WBC 3.9 (L) 04/24/2009    HGB 12.1 04/24/2009    HCT 34.8 (L) 04/24/2009     04/24/2009     BMP:   Lab Results   Component Value Date     04/24/2009    POTASSIUM 3.9 04/24/2009    CHLORIDE 101 04/24/2009    CO2 26 04/24/2009    BUN 20 04/24/2009    CR 0.91 04/24/2009    GLC 99 04/24/2009     COAGS: No results found for: PTT, INR, FIBR  POC: No results found for: BGM, HCG, HCGS  HEPATIC: No results found for: ALBUMIN, PROTTOTAL, ALT, AST, GGT, ALKPHOS, BILITOTAL, BILIDIRECT, FAMILIA  OTHER:   Lab Results   Component Value Date    BELKYS 9.2 04/24/2009       Anesthesia Plan    ASA Status:  2      Anesthesia Type: MAC.     - Reason for MAC: straight local not clinically adequate              Consents    Anesthesia Plan(s) and associated risks, benefits, and realistic alternatives discussed. Questions answered and patient/representative(s) expressed understanding.    - Discussed:     - Discussed with:  Patient         Postoperative Care    Pain management: IV analgesics, Multi-modal analgesia.   PONV prophylaxis: Ondansetron (or other 5HT-3)     Comments:           H&P reviewed: Unable to attach H&P to encounter due to EHR limitations. H&P Update: appropriate H&P reviewed, patient examined. No interval changes since H&P (within 30 days).         Minh Oconnor MD

## 2022-03-31 LAB
PATH REPORT.COMMENTS IMP SPEC: ABNORMAL
PATH REPORT.COMMENTS IMP SPEC: YES
PATH REPORT.FINAL DX SPEC: ABNORMAL
PATH REPORT.GROSS SPEC: ABNORMAL
PATH REPORT.RELEVANT HX SPEC: ABNORMAL

## 2022-03-31 PROCEDURE — 88313 SPECIAL STAINS GROUP 2: CPT | Mod: 26 | Performed by: PATHOLOGY

## 2022-03-31 PROCEDURE — 88108 CYTOPATH CONCENTRATE TECH: CPT | Mod: 26 | Performed by: PATHOLOGY

## 2022-09-21 ENCOUNTER — ANESTHESIA (OUTPATIENT)
Dept: GASTROENTEROLOGY | Facility: CLINIC | Age: 77
End: 2022-09-21
Payer: COMMERCIAL

## 2022-09-21 ENCOUNTER — ANESTHESIA EVENT (OUTPATIENT)
Dept: GASTROENTEROLOGY | Facility: CLINIC | Age: 77
End: 2022-09-21
Payer: COMMERCIAL

## 2022-09-21 ENCOUNTER — HOSPITAL ENCOUNTER (OUTPATIENT)
Facility: CLINIC | Age: 77
Discharge: HOME | End: 2022-09-21
Attending: INTERNAL MEDICINE | Admitting: INTERNAL MEDICINE
Payer: COMMERCIAL

## 2022-09-21 VITALS
RESPIRATION RATE: 15 BRPM | WEIGHT: 140 LBS | SYSTOLIC BLOOD PRESSURE: 116 MMHG | DIASTOLIC BLOOD PRESSURE: 63 MMHG | OXYGEN SATURATION: 97 % | HEART RATE: 58 BPM | BODY MASS INDEX: 23.32 KG/M2 | HEIGHT: 65 IN

## 2022-09-21 LAB — UPPER EUS: NORMAL

## 2022-09-21 PROCEDURE — 250N000011 HC RX IP 250 OP 636

## 2022-09-21 PROCEDURE — 258N000003 HC RX IP 258 OP 636

## 2022-09-21 PROCEDURE — 999N000010 HC STATISTIC ANES STAT CODE-CRNA PER MINUTE: Performed by: INTERNAL MEDICINE

## 2022-09-21 PROCEDURE — 370N000017 HC ANESTHESIA TECHNICAL FEE, PER MIN: Performed by: INTERNAL MEDICINE

## 2022-09-21 PROCEDURE — 250N000011 HC RX IP 250 OP 636: Performed by: INTERNAL MEDICINE

## 2022-09-21 PROCEDURE — 43242 EGD US FINE NEEDLE BX/ASPIR: CPT | Performed by: INTERNAL MEDICINE

## 2022-09-21 PROCEDURE — 43238 EGD US FINE NEEDLE BX/ASPIR: CPT | Performed by: INTERNAL MEDICINE

## 2022-09-21 PROCEDURE — 250N000009 HC RX 250

## 2022-09-21 PROCEDURE — 88108 CYTOPATH CONCENTRATE TECH: CPT | Mod: TC | Performed by: INTERNAL MEDICINE

## 2022-09-21 PROCEDURE — 88313 SPECIAL STAINS GROUP 2: CPT | Mod: TC | Performed by: INTERNAL MEDICINE

## 2022-09-21 PROCEDURE — 82378 CARCINOEMBRYONIC ANTIGEN: CPT | Performed by: INTERNAL MEDICINE

## 2022-09-21 RX ORDER — NALOXONE HYDROCHLORIDE 0.4 MG/ML
0.2 INJECTION, SOLUTION INTRAMUSCULAR; INTRAVENOUS; SUBCUTANEOUS
Status: CANCELLED | OUTPATIENT
Start: 2022-09-21

## 2022-09-21 RX ORDER — CIPROFLOXACIN 2 MG/ML
400 INJECTION, SOLUTION INTRAVENOUS ONCE
Status: COMPLETED | OUTPATIENT
Start: 2022-09-21 | End: 2022-09-21

## 2022-09-21 RX ORDER — ONDANSETRON 2 MG/ML
4 INJECTION INTRAMUSCULAR; INTRAVENOUS EVERY 6 HOURS PRN
Status: CANCELLED | OUTPATIENT
Start: 2022-09-21

## 2022-09-21 RX ORDER — UBIDECARENONE 100 MG
100 CAPSULE ORAL DAILY
COMMUNITY

## 2022-09-21 RX ORDER — SODIUM CHLORIDE, SODIUM LACTATE, POTASSIUM CHLORIDE, CALCIUM CHLORIDE 600; 310; 30; 20 MG/100ML; MG/100ML; MG/100ML; MG/100ML
INJECTION, SOLUTION INTRAVENOUS CONTINUOUS PRN
Status: DISCONTINUED | OUTPATIENT
Start: 2022-09-21 | End: 2022-09-21

## 2022-09-21 RX ORDER — DEXMEDETOMIDINE HYDROCHLORIDE 4 UG/ML
INJECTION, SOLUTION INTRAVENOUS PRN
Status: DISCONTINUED | OUTPATIENT
Start: 2022-09-21 | End: 2022-09-21

## 2022-09-21 RX ORDER — ONDANSETRON 4 MG/1
4 TABLET, ORALLY DISINTEGRATING ORAL EVERY 6 HOURS PRN
Status: CANCELLED | OUTPATIENT
Start: 2022-09-21

## 2022-09-21 RX ORDER — ASPIRIN 81 MG/1
81 TABLET, CHEWABLE ORAL DAILY
COMMUNITY

## 2022-09-21 RX ORDER — ONDANSETRON 2 MG/ML
INJECTION INTRAMUSCULAR; INTRAVENOUS PRN
Status: DISCONTINUED | OUTPATIENT
Start: 2022-09-21 | End: 2022-09-21

## 2022-09-21 RX ORDER — NALOXONE HYDROCHLORIDE 0.4 MG/ML
0.4 INJECTION, SOLUTION INTRAMUSCULAR; INTRAVENOUS; SUBCUTANEOUS
Status: CANCELLED | OUTPATIENT
Start: 2022-09-21

## 2022-09-21 RX ORDER — LIDOCAINE HYDROCHLORIDE 20 MG/ML
INJECTION, SOLUTION INFILTRATION; PERINEURAL PRN
Status: DISCONTINUED | OUTPATIENT
Start: 2022-09-21 | End: 2022-09-21

## 2022-09-21 RX ORDER — PROPOFOL 10 MG/ML
INJECTION, EMULSION INTRAVENOUS PRN
Status: DISCONTINUED | OUTPATIENT
Start: 2022-09-21 | End: 2022-09-21

## 2022-09-21 RX ORDER — PROPOFOL 10 MG/ML
INJECTION, EMULSION INTRAVENOUS CONTINUOUS PRN
Status: DISCONTINUED | OUTPATIENT
Start: 2022-09-21 | End: 2022-09-21

## 2022-09-21 RX ORDER — LIDOCAINE 40 MG/G
CREAM TOPICAL
Status: DISCONTINUED | OUTPATIENT
Start: 2022-09-21 | End: 2022-09-21 | Stop reason: HOSPADM

## 2022-09-21 RX ORDER — FLUMAZENIL 0.1 MG/ML
0.2 INJECTION, SOLUTION INTRAVENOUS
Status: CANCELLED | OUTPATIENT
Start: 2022-09-21 | End: 2022-09-21

## 2022-09-21 RX ADMIN — DEXMEDETOMIDINE HYDROCHLORIDE 8 MCG: 200 INJECTION INTRAVENOUS at 13:17

## 2022-09-21 RX ADMIN — PROPOFOL 30 MG: 10 INJECTION, EMULSION INTRAVENOUS at 13:18

## 2022-09-21 RX ADMIN — PROPOFOL 150 MCG/KG/MIN: 10 INJECTION, EMULSION INTRAVENOUS at 13:17

## 2022-09-21 RX ADMIN — PROPOFOL 40 MG: 10 INJECTION, EMULSION INTRAVENOUS at 13:20

## 2022-09-21 RX ADMIN — SODIUM CHLORIDE, POTASSIUM CHLORIDE, SODIUM LACTATE AND CALCIUM CHLORIDE: 600; 310; 30; 20 INJECTION, SOLUTION INTRAVENOUS at 13:14

## 2022-09-21 RX ADMIN — LIDOCAINE HYDROCHLORIDE 100 MG: 20 INJECTION, SOLUTION INFILTRATION; PERINEURAL at 13:17

## 2022-09-21 RX ADMIN — CIPROFLOXACIN 400 MG: 2 INJECTION INTRAVENOUS at 13:17

## 2022-09-21 RX ADMIN — ONDANSETRON 4 MG: 2 INJECTION INTRAMUSCULAR; INTRAVENOUS at 13:17

## 2022-09-21 ASSESSMENT — ENCOUNTER SYMPTOMS
SEIZURES: 0
DYSRHYTHMIAS: 0
ORTHOPNEA: 0

## 2022-09-21 ASSESSMENT — LIFESTYLE VARIABLES: TOBACCO_USE: 0

## 2022-09-21 ASSESSMENT — COPD QUESTIONNAIRES: COPD: 0

## 2022-09-21 ASSESSMENT — ACTIVITIES OF DAILY LIVING (ADL): ADLS_ACUITY_SCORE: 35

## 2022-09-21 NOTE — ANESTHESIA POSTPROCEDURE EVALUATION
Patient: Estiven Suarez    Procedure: Procedure(s):  endoscopic ultrasound with fna       Anesthesia Type:  MAC    Note:  Disposition: Outpatient   Postop Pain Control: Uneventful            Sign Out: Well controlled pain   PONV: No   Neuro/Psych: Uneventful            Sign Out: Acceptable/Baseline neuro status   Airway/Respiratory: Uneventful            Sign Out: Acceptable/Baseline resp. status   CV/Hemodynamics: Uneventful            Sign Out: Acceptable CV status   Other NRE:    DID A NON-ROUTINE EVENT OCCUR? No           Last vitals:  Vitals Value Taken Time   /63 09/21/22 1400   Temp     Pulse 58 09/21/22 1400   Resp 15 09/21/22 1407   SpO2 99 % 09/21/22 1405   Vitals shown include unvalidated device data.    Electronically Signed By: Berkley Chance  September 21, 2022  4:56 PM

## 2022-09-21 NOTE — ANESTHESIA CARE TRANSFER NOTE
Patient: Estiven Suarez    Procedure: Procedure(s):  endoscopic ultrasound with fna       Diagnosis: Pancreatic cyst [K86.2]  Diagnosis Additional Information: No value filed.    Anesthesia Type:   MAC     Note:    Oropharynx: oropharynx clear of all foreign objects and spontaneously breathing  Level of Consciousness: drowsy  Oxygen Supplementation: face mask  Level of Supplemental Oxygen (L/min / FiO2): 6  Independent Airway: airway patency satisfactory and stable  Dentition: dentition unchanged  Vital Signs Stable: post-procedure vital signs reviewed and stable  Report to RN Given: handoff report given  Patient transferred to: PACU (endo)    Handoff Report: Identifed the Patient, Identified the Reponsible Provider, Reviewed the pertinent medical history, Discussed the surgical course, Reviewed Intra-OP anesthesia mangement and issues during anesthesia, Set expectations for post-procedure period and Allowed opportunity for questions and acknowledgement of understanding      Vitals:  Vitals Value Taken Time   /69    Temp     Pulse 70    Resp 14    SpO2 99        Electronically Signed By: NARCISA Kolb CRNA  September 21, 2022  1:37 PM

## 2022-09-21 NOTE — ANESTHESIA PREPROCEDURE EVALUATION
Anesthesia Pre-Procedure Evaluation    Patient: Estiven Suarez   MRN: [id number] : 1945        Procedure : Procedure(s):  endoscopic ultrasound with fna          Past Medical History:   Diagnosis Date     Hypertension      Thyroid disease       Past Surgical History:   Procedure Laterality Date     APPENDECTOMY       ESOPHAGOSCOPY, GASTROSCOPY, DUODENOSCOPY (EGD), COMBINED N/A 2021    Procedure: ESOPHAGOGASTRODUODENOSCOPY,, WITH ENDOSCOPIC ULTRASOUND GUIDANCE and  WITH FINE NEEDLE ASPIRATION BIOPSY;  Surgeon: Kirstin Byrne MD;  Location:  GI     ESOPHAGOSCOPY, GASTROSCOPY, DUODENOSCOPY (EGD), COMBINED N/A 3/29/2022    Procedure: ESOPHAGOGASTRODUODENOSCOPY, WITH FINE NEEDLE ASPIRATION BIOPSY, WITH ENDOSCOPIC ULTRASOUND GUIDANCE;  Surgeon: Kirstin Byrne MD;  Location:  GI      No Known Allergies   Social History     Tobacco Use     Smoking status: Never Smoker     Smokeless tobacco: Never Used   Substance Use Topics     Alcohol use: Not on file     Comment: occ      Wt Readings from Last 1 Encounters:   22 72.6 kg (160 lb)        Prior to Admission medications    Medication Sig Start Date End Date Taking? Authorizing Provider   amLODIPine (NORVASC) 2.5 MG tablet Take 1.25 mg by mouth  7/10/19   Reported, Patient   ciprofloxacin (CIPRO) 250 MG tablet Take 1 tablet (250 mg) by mouth 2 times daily 3/29/22   Kirstin Byrne MD   ciprofloxacin (CIPRO) 250 MG tablet Take 1 tablet (250 mg) by mouth 2 times daily 3/29/22   Kirstin Byrne MD   cyanocobalamin (CYANOCOBALAMIN) 1000 MCG/ML injection Inject 1,000 mcg into the muscle 19   Reported, Patient   levothyroxine (SYNTHROID/LEVOTHROID) 88 MCG tablet Take 88 mcg by mouth 20   Reported, Patient   losartan-hydrochlorothiazide (HYZAAR) 50-12.5 MG tablet Take half tab QD. 3/31/21   Reported, Patient   nitroFURantoin macrocrystal-monohydrate (MACROBID) 100 MG capsule  21   Reported, Patient    rosuvastatin (CRESTOR) 10 MG tablet Take 10 mg by mouth 9/21/20   Reported, Patient     Anesthesia Evaluation   Pt has had prior anesthetic. Type: General and MAC.    No history of anesthetic complications       ROS/MED HX  ENT/Pulmonary:    (-) tobacco use, asthma, COPD, sleep apnea and recent URI   Neurologic:    (-) no seizures and no CVA   Cardiovascular: Comment: Ct Angio 6/2022:   1. No obstructive epicardial coronary artery disease to explain patient's   symptoms.   2. Diffuse nonobstructive coronary atherosclerosis.   -Total coronary artery calcium score 129. BASS percentile based on age,   gender, and race is 67.   3. No acute aortic pathology. There is moderate calcified atherosclerosis   of the descending thoracic aorta.     (+) Dyslipidemia hypertension--CAD ---Previous cardiac testing   Echo: Date: 2021 Results:  Stress Echo   1. Maximum stress test with 96.3% of age predicted maximum heart rate achieved.   2. During stress exam the patient developed no significant symptoms.   3. There were no ischemic changes by EKG during stress.   4. Post stress, decreased left ventricular size, increased global systolic function with an estimated EF of >75%.   5. Negative stress echo for ischemia.   6. At rest there is normal left ventricular size and systolic function.   7. The aortic valve is trileaflet and sclerotic, no stenosis and trivial regurgitation.  Stress Test: Date: Results:    ECG Reviewed: Date: Results:    Cath: Date: Results:   (-) angina, past MI, CHF, INFANTE, orthopnea/PND, syncope, arrhythmias, irregular heartbeat/palpitations, valvular problems/murmurs, stent, angina, past MI, CABG, murmur and wheezes   METS/Exercise Tolerance: >4 METS    Hematologic:    (-) history of blood transfusion   Musculoskeletal:       GI/Hepatic:    (-) GERD and liver disease   Renal/Genitourinary:    (-) renal disease   Endo:     (+) type I DM (Pt states that she had her second COVID booster, and then developed Type I  diabetes and was admitted to the hospital with DKA - now has insulin pump and monitor), Using insulin, - using insulin pump. Previously admitted for DM/DKA. thyroid problem, hypothyroidism,  (-) Type II DM and chronic steroid usage   Psychiatric/Substance Use:    (-) alcohol abuse history   Infectious Disease:       Malignancy:       Other:            Physical Exam    Airway        Mallampati: II   TM distance: > 3 FB   Neck ROM: full   Mouth opening: > 3 cm    Respiratory Devices and Support         Dental  no notable dental history         Cardiovascular          Rhythm and rate: regular and normal (-) no murmur    Pulmonary           breath sounds clear to auscultation   (-) no rhonchi and no wheezes        OUTSIDE LABS:  CBC:   Lab Results   Component Value Date    WBC 3.9 (L) 04/24/2009    HGB 12.1 04/24/2009    HCT 34.8 (L) 04/24/2009     04/24/2009     BMP:   Lab Results   Component Value Date     04/24/2009    POTASSIUM 3.9 04/24/2009    CHLORIDE 101 04/24/2009    CO2 26 04/24/2009    BUN 20 04/24/2009    CR 0.91 04/24/2009    GLC 99 04/24/2009     OTHER:   Lab Results   Component Value Date    BELKYS 9.2 04/24/2009       Anesthesia Plan    ASA Status:  2      Anesthesia Type: MAC.     - Reason for MAC: straight local not clinically adequate              Consents            Postoperative Care    Pain management: IV analgesics.   PONV prophylaxis: Ondansetron (or other 5HT-3), Background Propofol Infusion     Comments:           H&P reviewed: Unable to attach H&P to encounter due to EHR limitations. H&P Update: appropriate H&P reviewed, patient examined. No interval changes since H&P (within 30 days).         Gerardo Denny MD

## 2022-09-22 LAB
AMYLASE FLD-CCNC: >7500 U/L
CEA FLD-MCNC: 163 NG/ML

## 2022-09-23 LAB
PATH REPORT.COMMENTS IMP SPEC: NORMAL
PATH REPORT.COMMENTS IMP SPEC: NORMAL
PATH REPORT.FINAL DX SPEC: NORMAL
PATH REPORT.GROSS SPEC: NORMAL
PATH REPORT.MICROSCOPIC SPEC OTHER STN: NORMAL
PATH REPORT.RELEVANT HX SPEC: NORMAL

## 2022-09-23 PROCEDURE — 88108 CYTOPATH CONCENTRATE TECH: CPT | Mod: 26 | Performed by: PATHOLOGY

## 2022-09-23 PROCEDURE — 88313 SPECIAL STAINS GROUP 2: CPT | Mod: 26 | Performed by: PATHOLOGY

## 2022-10-12 ENCOUNTER — TELEPHONE (OUTPATIENT)
Dept: VASCULAR SURGERY | Facility: CLINIC | Age: 77
End: 2022-10-12

## 2022-10-12 NOTE — TELEPHONE ENCOUNTER
10/12/2022    Vein Clinic Preoperative Nurse Call    Procedure: Right leg VenaSeal ASV(med nec) u/s sclero (med nec) 1/1 session exp. 11/1/22   Date: Wednesday 10/19  Surgeon: Dr. Sanchez  Time: 1230  Check in time: 1200    Called patient and left a detailed message. Informed patient: when to check in (1200) to sign consent. Instructed patient to wear a mask, wear loose-fitting comfortable clothing, and bring their compression hose.     Patient is able to drive herself to and from her procedure.    Pt needs Thigh High compression hose for procedure. Status of the hose: Confirm with patient if she was planning on purchasing a pair from our clinic.    Special instructions: Pt may continue taking her Aspirin 81mg.     Special COVID-19 instructions: Patient aware they need to wear a mask to our clinic and during their procedure. Patient aware their  can come in with them, but would need to stay in an exam room during the procedure or wait in the parking lot or leave. Nurse will call patient's  when procedure is over.    Patient understands if they have any of the following symptoms (fever, cough, shortness of breath, rash), they need to notify us immediately to cancel their procedure and will have to reschedule for a later date.    Gave patient our call back number if any further questions or concerns.    Shy Magana RN  [facility]

## 2022-10-17 ENCOUNTER — TELEPHONE (OUTPATIENT)
Dept: VASCULAR SURGERY | Facility: CLINIC | Age: 77
End: 2022-10-17

## 2022-10-17 NOTE — TELEPHONE ENCOUNTER
Called pt and reviewed all preop information with her. Pt is in agreement with plan. Pt will drive herself and take her usual medications, and check in at 12:00pm on Wednesday.    Shy Magana RN  [facility]  Vein Clinic

## 2022-10-17 NOTE — TELEPHONE ENCOUNTER
"Patient called wondering what medications she will be taking for procedure will have an effect on her insulin. I reviewed Laura and Shy notes stating that patient would be having a VenaSeal and ultrasound guided sclerotherapy. Explained to patient we generally dont prescribe medications with that particular procedure, unless it was zofran or prophylactic antibiotic. Patient informed me that was what her paperwork said. Asked for clarification if she would be driving herself and she stated \"No. The paperwork said I was taking medication and would need a .\" Apologized to patient for misunderstanding. Informed patient a nurse called and and left a message last week regarding pre procedure information with details of all this information and was waiting for a call back to confirm the message had been received. Patient stated she called back and spoke with a nurse. \"You guys don't take very good notes.\" \"Also, are you aware that after having Covid blood clots are more likely to occur and I had Covid 2 weeks ago?\" Explained to patient I am aware and instructed patient that she is to continue taking her Aspirin. \"You didn't know, that's why I am telling you. I plan on continuing my Aspirin.\" Confirmed with patient that she will be purchasing hose from our clinic. Informed patient that myself or another nurse would call her back once we reviewed the medications/insulin and if she was to be taking any medication for the procedure. Patient verbalized understanding.     Spoke with Shy PARTIDA Via phone. She will be calling patient back in regards to pre procedure concerns.               "

## 2022-10-18 NOTE — PATIENT INSTRUCTIONS
Post-Procedure Instructions:                         VenaSeal  You had a VenaSeal procedure, where one or more veins were closed.     First 72 hours:  Ibuprofen: If tolerated, take ibuprofen to reduce inflammation whether you have pain or not. Take 2 tablets (200mg each) after every meal and at bedtime with a snack.       Aspirin: Take one 325 mg aspirin tablet to reduce blood clot risk.    Bandage(s):    Keep your bandage(s) on and dry for 48 hours.    Activities:      Resume normal activities as tolerated.     Bathing: Do not take baths, sit in a hot tub, or go swimming for one week. You may shower the day after the procedure.    Medications:    You may continue to take your normal medications including aspirin and ibuprofen.      Call Us If:    You see any areas on your leg that are red and angry in appearance.  You notice any drainage that is milky or cloudy in appearance or that has a foul odor.  You run a temperature of 100.5 or greater.      Post-Op Day Three:  You will have a follow up appointment 2-4 days post-procedure. At this appointment, you will have an ultrasound and we will check your incisions.      Six Week Appointment  At your six week appointment, you will see your surgeon for an exam and evaluation. This office visit will be scheduled when you return for post-op day three appointment.       SCLEROTHERAPY AFTER CARE  Immediately:  After treatment, walk for 10-15 minutes before getting in your car.  If your trip home is more than 1 hour, stop and walk around for 5-10 minutes. Avoid sitting or standing for extended periods.   First 24 hours: Wear your compression continuously, even while in bed. After the 24 hours, you may shower if you want to. Put your hose back on, unless you are going to bed. You should NOT wear compression to bed after the first 24 hours. You may fly the next day, but wear your compression.   For 5 days: Wear the compression hose for waking hours only. You may continue to wear  them longer than 5 days if you prefer.   For days 5-7: Walking is encouraged, as it promotes efficient circulation in your veins. You may do activities that raise your heart rate, but do NOT run, jog, do high impact aerobics, or weight lifting. After 7 days, no activity restrictions.    Shaving: Wait a few days to shave or apply lotion.   Bathing: Do NOT take hot baths or sit in a hot tub for 7-10 days.    For 1 year: Wear SPF 30 sunscreen on your legs when in the sun. This is very important! It helps prevent darkening of the skin at the injection sites.   Medications: You may resume your usual medications, including aspirin or ibuprofen.    Common Things to Expect  -  Compression must be worn for the first 24 hours and then during the day for 5-7 days.    -  If larger veins are treated with ultrasound-guided sclerotherapy, you will have redness, firmness, tenderness, and swelling.  This firmness and tenderness may take 3-6 months to resolve. Ibuprofen and compression hose will aid in this process.    -  You will have bruising that can last up to 3 weeks. Most fading of the veins will occur between 3 and 6 weeks after treatment.    -  You may notice brown discoloration (hyperpigmentation) at the treatment site.  This should fade with time, but will take 3 months to 1 year to fully heal.     -  Some treated veins may look darker because of trapped blood within the vein. This trapped blood can be removed at a minimum of 1 month following treatment. Larger veins are more likely to develop trapped blood.    -  It is very important for you to use at least SPF 30 sunscreen in order to help prevent the discoloration of your skin.    -  Migraines rarely occur following sclerotherapy, but are more likely in patients with a history of migraines.  Treat as you would any other migraine.

## 2022-10-19 ENCOUNTER — OFFICE VISIT (OUTPATIENT)
Dept: VASCULAR SURGERY | Facility: CLINIC | Age: 77
End: 2022-10-19
Payer: COMMERCIAL

## 2022-10-19 VITALS — HEART RATE: 63 BPM | SYSTOLIC BLOOD PRESSURE: 117 MMHG | DIASTOLIC BLOOD PRESSURE: 70 MMHG | OXYGEN SATURATION: 95 %

## 2022-10-19 DIAGNOSIS — Z09 POSTOP CHECK: Primary | ICD-10-CM

## 2022-10-19 DIAGNOSIS — I83.811 VARICOSE VEINS OF RIGHT LOWER EXTREMITY WITH PAIN: Primary | ICD-10-CM

## 2022-10-19 PROCEDURE — 99207 PR NO CHARGE NURSE ONLY: CPT

## 2022-10-19 PROCEDURE — 36470 NJX SCLRSNT 1 INCMPTNT VEIN: CPT | Mod: RT | Performed by: SURGERY

## 2022-10-19 PROCEDURE — 36482 ENDOVEN THER CHEM ADHES 1ST: CPT | Mod: RT | Performed by: SURGERY

## 2022-10-19 PROCEDURE — A6533 GC STOCKING THIGHLNGTH 18-30: HCPCS

## 2022-10-19 NOTE — PROGRESS NOTES
Patient purchased one pair(s) of black, thigh high, open-toe, size 2 compression hose from the clinic today.     Informed patient all compression hose purchases are final.    Carlota Tavera RN on 10/19/2022 at 12:32 PM

## 2022-10-19 NOTE — PROGRESS NOTES
Vein Clinic Procedure Note    Indications:  Right leg varicose veins with pain; symptoms recalcitrant to conservative measures    Procedure:  1. Cyanoacrylate glue ablation right anterior accessory saphenous vein  2. Medically necessary sclerotherapy right anterior accessory saphenous vein varicosities    Procedure Description  Details of the procedure including risks of bleeding, infection, nerve injury, scarring, hyperpigmentation, deep vein thrombosis, recanalization of the right anterior accessory saphenous vein and recurrent varicose veins all discussed.  The patient voiced understanding and wished to proceed.  Informed consent was obtained.     I initialed her right lower extremity marking the operative site with indelible marker.  We then proceeded the operating room, had the patient lie supine on the operating table, then prepped and draped the right lower extremity sterilely.    We took a timeout to confirm the appropriate operative site and procedure: Cyanoacrylate glue ablation right anterior accessory saphenous vein with medically necessary sclerotherapy    VNUS Closure  I imaged the right lower extremity easily identifying the right anterior accessory saphenous vein.  I infiltrated the skin overlying the vein just distal to the mid thigh, placed a micropuncture needle into the vein followed by a micropuncture guidewire and a 7 Bulgarian sheath. The primed VenaSeal delivery catheter was passed through the sheath into the anterior  accessory saphenous vein and the tip positioned 5 cm from the saphenofemoral junction under ultrasound guidance.    Identified the delivery catheter tip with the ultrasound probe transversely oriented, slid the probe 1.5 cm cephalad, applied compression to include the anterior accessory saphenous vein, delivered 1 aliquot of glue, withdrew the catheter 1 cm, delivered another aliquot of glue, withdrew the catheter 3 cm then held compression for 3 minutes.  After 3 minutes of  compression, I reidentified the delivery catheter tip, slid the probe 1.5 cm cephalad, occluded the vein, delivered 1 aliquot of glue, withdrew the catheter for centimeters, delivered another aliquot of glue, withdrew the catheter 3 cm, then held compression for 30 seconds.  This latter sequence was repeated until I approached the introducer sheath.    Under ultrasound guidance, I incarcerated through the introducer sheath ensuring that the tip lay at least 2 cm inside the anterior accessory saphenous vein.  The last aliquot of glue was delivered approximately 3 cm from the  tip of the introducer sheath, the catheter withdrawn 2 cm and compression held for 30 seconds.  I then removed the delivery catheter, leaving the sheath in place.  Lastly, I removed the introducer sheath.    I reimaged the vein and noted that the vein was noncompressible and closed.  The saphenofemoral junction and common femoral veins were fully compressible and free of thrombus.    The leg was cleaned with saline solution and a small gauze and Tegaderm dressing applied to the access site.  A compression hose was then applied.  We observed the patient for 30 minutes to ensure excellent hemostasis then took her to her friend's car in a wheelchair.  Post procedure instructions were given to the patient and her  in verbal and written form and their questions answered.    The patient tolerated the procedure well without evidence of allergic reaction or other complications and will return in 72 hours for a right lower extremity venous ultrasound.    Venaseal Closure    Date/Time: 10/19/2022 3:39 PM  Performed by: Clay Sanchez MD  Authorized by: Clay Sanchez MD     Time out: Immediately prior to the procedure a time out was called    Preparation: Patient was prepped and draped in usual sterile fashion    1st Assist: Mary Yañez CST/JAC    Circulator: Carlota Tavera RN    Procedure:  Venaseal  Procedure side:   Right  Vein(s) (VenaSeal):     One Vein    Vein Treated:  ASV  Patient tolerance:  Patient tolerated the procedure well with no immediate complications  Wrap/Hose:  Hose  Sclerotherapy    Date/Time: 10/19/2022 3:39 PM  Performed by: Clay Sanchez MD  Authorized by: Clay Sanchez MD     Type:  Medically Necessary  Vein:  One Vein  Yes    Procedure side:  Right  Solution/Amount:  1% POLIDOCANOL  Syringes:  1 syringe (3 mL 1% polidocanol foam)  Patient tolerance:  Patient tolerated the procedure well with no immediate complications        Flowsheet Data 10/19/2022   Procedure Start Time:  1:00 PM   Prep: Chloraprep   Side: Right   Tx Length (cm): RIGHT ASV: 16   Junction (cm): RIGHT ASV: 5   Cyanoacrylate used (ml): RIGHT GSV: 0.5   Sedation taken: No   Pre Pt. Physical / Cognitive Limitations: WNL   TOTAL Local anesthesia Injected (ml): 3   Max Volume Local Anesthesia (ml): 11   Post Pt. Physical / Cognitive Limitations: WNL   Procedure End Time:  1:22 PM   D/C Instructions given, states readiness to leave and escorted to car: Yes       AURORA Sanchez MD    Dictated using Dragon voice recognition software which may result in transcription errors    Pre-procedure Nursing Note    Estiven Suarez presents to clinic for Vein Procedure  .   /Person Responsible for Patient: N/A       Prophylactic Medication:N/A   Sedation Medication: N/A  Compression Stockings: Patient purchased from clinic today.  The procedure is being performed on RLE.  Patient understanding of procedure matches consent? YES    Patient's pre-procedure medications verified by Carlota QUINTANILLA.    Carlota Tavera RN on 10/19/2022 at 12:15 PM

## 2022-10-19 NOTE — LETTER
10/19/2022         RE: Estiven Suarez  [address] [address]        Dear Colleague,    Thank you for referring your patient, Estiven Suarez, to the [facility] [place]. Please see a copy of my visit note below.        Vein Clinic Procedure Note    Indications:  Right leg varicose veins with pain; symptoms recalcitrant to conservative measures    Procedure:  1. Cyanoacrylate glue ablation right anterior accessory saphenous vein  2. Medically necessary sclerotherapy right anterior accessory saphenous vein varicosities    Procedure Description  Details of the procedure including risks of bleeding, infection, nerve injury, scarring, hyperpigmentation, deep vein thrombosis, recanalization of the right anterior accessory saphenous vein and recurrent varicose veins all discussed.  The patient voiced understanding and wished to proceed.  Informed consent was obtained.     I initialed her right lower extremity marking the operative site with indelible marker.  We then proceeded the operating room, had the patient lie supine on the operating table, then prepped and draped the right lower extremity sterilely.    We took a timeout to confirm the appropriate operative site and procedure: Cyanoacrylate glue ablation right anterior accessory saphenous vein with medically necessary sclerotherapy    VNUS Closure  I imaged the right lower extremity easily identifying the right anterior accessory saphenous vein.  I infiltrated the skin overlying the vein just distal to the mid thigh, placed a micropuncture needle into the vein followed by a micropuncture guidewire and a 7 Czech sheath. The primed VenaSeal delivery catheter was passed through the sheath into the anterior  accessory saphenous vein and the tip positioned 5 cm from the saphenofemoral junction under ultrasound guidance.    Identified the delivery catheter tip with the ultrasound probe transversely oriented, slid the probe 1.5 cm  cephalad, applied compression to include the anterior accessory saphenous vein, delivered 1 aliquot of glue, withdrew the catheter 1 cm, delivered another aliquot of glue, withdrew the catheter 3 cm then held compression for 3 minutes.  After 3 minutes of compression, I reidentified the delivery catheter tip, slid the probe 1.5 cm cephalad, occluded the vein, delivered 1 aliquot of glue, withdrew the catheter for centimeters, delivered another aliquot of glue, withdrew the catheter 3 cm, then held compression for 30 seconds.  This latter sequence was repeated until I approached the introducer sheath.    Under ultrasound guidance, I incarcerated through the introducer sheath ensuring that the tip lay at least 2 cm inside the anterior accessory saphenous vein.  The last aliquot of glue was delivered approximately 3 cm from the  tip of the introducer sheath, the catheter withdrawn 2 cm and compression held for 30 seconds.  I then removed the delivery catheter, leaving the sheath in place.  Lastly, I removed the introducer sheath.    I reimaged the vein and noted that the vein was noncompressible and closed.  The saphenofemoral junction and common femoral veins were fully compressible and free of thrombus.    The leg was cleaned with saline solution and a small gauze and Tegaderm dressing applied to the access site.  A compression hose was then applied.  We observed the patient for 30 minutes to ensure excellent hemostasis then took her to her friend's car in a wheelchair.  Post procedure instructions were given to the patient and her  in verbal and written form and their questions answered.    The patient tolerated the procedure well without evidence of allergic reaction or other complications and will return in 72 hours for a right lower extremity venous ultrasound.    Venaseal Closure    Date/Time: 10/19/2022 3:39 PM  Performed by: Clay Sanchez MD  Authorized by: Clay Sanchez MD      Time out: Immediately prior to the procedure a time out was called    Preparation: Patient was prepped and draped in usual sterile fashion    1st Assist: Mary Yañez CST/CSFA    Circulator: Carlota Tavera RN    Procedure:  Venaseal  Procedure side:  Right  Vein(s) (VenaSeal):     One Vein    Vein Treated:  ASV  Patient tolerance:  Patient tolerated the procedure well with no immediate complications  Wrap/Hose:  Hose  Sclerotherapy    Date/Time: 10/19/2022 3:39 PM  Performed by: Clay Sanchez MD  Authorized by: Clay Sanchez MD     Type:  Medically Necessary  Vein:  One Vein  Yes    Procedure side:  Right  Solution/Amount:  1% POLIDOCANOL  Syringes:  1 syringe (3 mL 1% polidocanol foam)  Patient tolerance:  Patient tolerated the procedure well with no immediate complications        Flowsheet Data 10/19/2022   Procedure Start Time:  1:00 PM   Prep: Chloraprep   Side: Right   Tx Length (cm): RIGHT ASV: 16   Junction (cm): RIGHT ASV: 5   Cyanoacrylate used (ml): RIGHT GSV: 0.5   Sedation taken: No   Pre Pt. Physical / Cognitive Limitations: WNL   TOTAL Local anesthesia Injected (ml): 3   Max Volume Local Anesthesia (ml): 11   Post Pt. Physical / Cognitive Limitations: WNL   Procedure End Time:  1:22 PM   D/C Instructions given, states readiness to leave and escorted to car: Yes       AURORA Sanchez MD    Dictated using Dragon voice recognition software which may result in transcription errors    Pre-procedure Nursing Note    Estiven Suarez presents to clinic for Vein Procedure  .   /Person Responsible for Patient: N/A       Prophylactic Medication:N/A   Sedation Medication: N/A  Compression Stockings: Patient purchased from clinic today.  The procedure is being performed on RLE.  Patient understanding of procedure matches consent? YES    Patient's pre-procedure medications verified by Carlota QUINTANILLA.    Carlota Tavera RN on 10/19/2022 at 12:15 PM      Again, thank you for  allowing me to participate in the care of your patient.        Sincerely,        Clay Sanchez MD

## 2022-10-21 ENCOUNTER — OFFICE VISIT (OUTPATIENT)
Dept: VASCULAR SURGERY | Facility: CLINIC | Age: 77
End: 2022-10-21
Attending: SURGERY
Payer: COMMERCIAL

## 2022-10-21 ENCOUNTER — ANCILLARY PROCEDURE (OUTPATIENT)
Dept: ULTRASOUND IMAGING | Facility: CLINIC | Age: 77
End: 2022-10-21
Attending: SURGERY
Payer: COMMERCIAL

## 2022-10-21 DIAGNOSIS — I83.811 VARICOSE VEINS OF LEG WITH PAIN, RIGHT: Primary | ICD-10-CM

## 2022-10-21 DIAGNOSIS — I83.811 VARICOSE VEINS OF RIGHT LOWER EXTREMITY WITH PAIN: ICD-10-CM

## 2022-10-21 PROCEDURE — 93971 EXTREMITY STUDY: CPT | Mod: RT | Performed by: SURGERY

## 2022-10-21 PROCEDURE — 99024 POSTOP FOLLOW-UP VISIT: CPT | Performed by: SURGERY

## 2022-10-21 NOTE — LETTER
10/21/2022         RE: Estiven Suarez  [address] [address]        Dear Colleague,    Thank you for referring your patient, Estiven Suarez, to the [facility] [place]. Please see a copy of my visit note below.     [facility]: Eugenieaudrey Suarez returns for 72-hour follow-up.  Dr. Sanchez on 10/19/2022 performed Cyanoacrylate closure of her incompetent right accessory saphenous vein along with medically necessary sclerotherapy of the right anterior accessory saphenous vein varicosities.  She has been wearing her compression with no discomfort at all.    On follow-up today she is very comfortable.  Very mild amount of bruising at the access site for the closure of the accessory saphenous vein in the mid anterior medial thigh.  No inflammatory reactions related to the procedure.  No swelling or other issues.    Ultrasound today confirmed appropriate closure of the accessory saphenous vein 14.3 mm from the junction to the mid thigh.    IMPRESSION: Doing very well following closure of incompetent right accessory saphenous vein and sclerotherapy.  She will wear her thigh-high compression stockings per protocol.    She has a 6-week follow-up with Dr. Sanchez with potential ultrasound directed sclerotherapy at that time.      Jong Mae MD      Again, thank you for allowing me to participate in the care of your patient.        Sincerely,        Jong Mae

## 2022-10-21 NOTE — PROGRESS NOTES
[facility]: Eugenie Suarez returns for 72-hour follow-up.  Dr. Sancehz on 10/19/2022 performed Cyanoacrylate closure of her incompetent right accessory saphenous vein along with medically necessary sclerotherapy of the right anterior accessory saphenous vein varicosities.  She has been wearing her compression with no discomfort at all.    On follow-up today she is very comfortable.  Very mild amount of bruising at the access site for the closure of the accessory saphenous vein in the mid anterior medial thigh.  No inflammatory reactions related to the procedure.  No swelling or other issues.    Ultrasound today confirmed appropriate closure of the accessory saphenous vein 14.3 mm from the junction to the mid thigh.    IMPRESSION: Doing very well following closure of incompetent right accessory saphenous vein and sclerotherapy.  She will wear her thigh-high compression stockings per protocol.    She has a 6-week follow-up with Dr. Sanchez with potential ultrasound directed sclerotherapy at that time.      Jong Mae MD

## 2022-12-16 ENCOUNTER — OFFICE VISIT (OUTPATIENT)
Dept: VASCULAR SURGERY | Facility: CLINIC | Age: 77
End: 2022-12-16
Payer: COMMERCIAL

## 2022-12-16 DIAGNOSIS — I83.811 VARICOSE VEINS OF LEG WITH PAIN, RIGHT: Primary | ICD-10-CM

## 2022-12-16 PROCEDURE — 99207 PR VEINSOLUTIONS POST OPERATIVE VISIT: CPT | Performed by: SURGERY

## 2022-12-16 NOTE — PROGRESS NOTES
[facility] [place] postop visit  Estiven Suarez returns in follow-up of cyanoacrylate glue ablation of the right anterior accessory saphenous vein with medically necessary sclerotherapy of multiple, right anterior extensor saphenous vein tributaries on 10/19/2022.  Overall she is doing well but is concerned that the vein on her right patella area has not dissipated as she would expect.    Exam  There is mild ecchymosis and some induration from the mid thigh to the distal thigh.  There is induration of the veins on her right patella area with some trapped blood in the 2 areas of concern.    Ultrasound  The right anterior accessory saphenous vein is noncompressible and filled with glue.    The tributary coursing from the anterior excessively saphenous vein down the anterior thigh is also noncompressible as are the veins in the patellar area.    I cleaned the areas of trapped blood just cephalad of the patella with alcohol, made stab wound with an 18-gauge needle and expressed thrombus.  We cleaned the area, applied dry cotton balls and Medipore tape.    Assessment  Excellent result following the above procedure.  The veins are closed and she needs no further therapy in this regard.    Plan  Return for 6-month post procedure right lower extremity venous ultrasound    C Jeanette Sanchez MD, FACS    Dictated using Dragon voice recognition software which may result in transcription errors

## 2022-12-16 NOTE — LETTER
12/16/2022         RE: Estiven Suarez  [address] [address]        Dear Colleague,    Thank you for referring your patient, Estiven Suarez, to the [facility] [place]. Please see a copy of my visit note below.    [facility] postop visit  Estiven Suarez returns in follow-up of cyanoacrylate glue ablation of the right anterior accessory saphenous vein with medically necessary sclerotherapy of multiple, right anterior extensor saphenous vein tributaries on 10/19/2022.  Overall she is doing well but is concerned that the vein on her right patella area has not dissipated as she would expect.    Exam  There is mild ecchymosis and some induration from the mid thigh to the distal thigh.  There is induration of the veins on her right patella area with some trapped blood in the 2 areas of concern.    Ultrasound  The right anterior accessory saphenous vein is noncompressible and filled with glue.    The tributary coursing from the anterior excessively saphenous vein down the anterior thigh is also noncompressible as are the veins in the patellar area.    I cleaned the areas of trapped blood just cephalad of the patella with alcohol, made stab wound with an 18-gauge needle and expressed thrombus.  We cleaned the area, applied dry cotton balls and Medipore tape.    Assessment  Excellent result following the above procedure.  The veins are closed and she needs no further therapy in this regard.    Plan  Return for 6-month post procedure right lower extremity venous ultrasound    AURORA Sanchez MD, FACS    Dictated using Dragon voice recognition software which may result in transcription errors      Again, thank you for allowing me to participate in the care of your patient.        Sincerely,        Clay Sanchez MD

## 2025-08-20 ENCOUNTER — APPOINTMENT (OUTPATIENT)
Dept: GENERAL RADIOLOGY | Facility: CLINIC | Age: 80
End: 2025-08-20
Attending: EMERGENCY MEDICINE
Payer: COMMERCIAL

## 2025-08-20 ENCOUNTER — HOSPITAL ENCOUNTER (EMERGENCY)
Facility: CLINIC | Age: 80
Discharge: HOME | End: 2025-08-20
Attending: EMERGENCY MEDICINE
Payer: COMMERCIAL

## 2025-08-20 VITALS
DIASTOLIC BLOOD PRESSURE: 77 MMHG | HEART RATE: 53 BPM | TEMPERATURE: 97.3 F | SYSTOLIC BLOOD PRESSURE: 135 MMHG | RESPIRATION RATE: 16 BRPM | OXYGEN SATURATION: 98 %

## 2025-08-20 DIAGNOSIS — U07.1 COVID-19 VIRUS INFECTION: Primary | ICD-10-CM

## 2025-08-20 LAB
ALBUMIN SERPL BCG-MCNC: 3.7 G/DL (ref 3.5–5.2)
ALP SERPL-CCNC: ABNORMAL U/L
ALT SERPL W P-5'-P-CCNC: ABNORMAL U/L
ANION GAP SERPL CALCULATED.3IONS-SCNC: 11 MMOL/L (ref 7–15)
AST SERPL W P-5'-P-CCNC: ABNORMAL U/L
BASOPHILS # BLD AUTO: <0.03 10E3/UL (ref 0–0.2)
BASOPHILS NFR BLD AUTO: 0.3 %
BILIRUB SERPL-MCNC: 0.6 MG/DL
BUN SERPL-MCNC: 15.6 MG/DL (ref 8–23)
CALCIUM SERPL-MCNC: 8.9 MG/DL (ref 8.8–10.4)
CHLORIDE SERPL-SCNC: 97 MMOL/L (ref 98–107)
CREAT SERPL-MCNC: 0.91 MG/DL (ref 0.51–0.95)
EGFRCR SERPLBLD CKD-EPI 2021: 65 ML/MIN/1.73M2
EOSINOPHIL # BLD AUTO: <0.03 10E3/UL (ref 0–0.7)
EOSINOPHIL NFR BLD AUTO: 0.3 %
ERYTHROCYTE [DISTWIDTH] IN BLOOD BY AUTOMATED COUNT: 13.6 % (ref 10–15)
GLUCOSE SERPL-MCNC: 297 MG/DL (ref 70–99)
HCO3 SERPL-SCNC: 24 MMOL/L (ref 22–29)
HCT VFR BLD AUTO: 35 % (ref 35–47)
HGB BLD-MCNC: 11.9 G/DL (ref 11.7–15.7)
IMM GRANULOCYTES # BLD: <0.03 10E3/UL
IMM GRANULOCYTES NFR BLD: 0.3 %
LYMPHOCYTES # BLD AUTO: 2.17 10E3/UL (ref 0.8–5.3)
LYMPHOCYTES NFR BLD AUTO: 34.2 %
MCH RBC QN AUTO: 31.4 PG (ref 26.5–33)
MCHC RBC AUTO-ENTMCNC: 34 G/DL (ref 31.5–36.5)
MCV RBC AUTO: 92.3 FL (ref 78–100)
MONOCYTES # BLD AUTO: 0.46 10E3/UL (ref 0–1.3)
MONOCYTES NFR BLD AUTO: 7.3 %
NEUTROPHILS # BLD AUTO: 3.65 10E3/UL (ref 1.6–8.3)
NEUTROPHILS NFR BLD AUTO: 57.6 %
NRBC # BLD AUTO: <0.03 10E3/UL
NRBC BLD AUTO-RTO: 0 /100
PLATELET # BLD AUTO: 243 10E3/UL (ref 150–450)
POTASSIUM SERPL-SCNC: 4.5 MMOL/L (ref 3.4–5.3)
PROT SERPL-MCNC: 6.8 G/DL (ref 6.4–8.3)
RBC # BLD AUTO: 3.79 10E6/UL (ref 3.8–5.2)
SODIUM SERPL-SCNC: 132 MMOL/L (ref 135–145)
WBC # BLD AUTO: 6.34 10E3/UL (ref 4–11)

## 2025-08-20 PROCEDURE — 250N000011 HC RX IP 250 OP 636: Performed by: EMERGENCY MEDICINE

## 2025-08-20 PROCEDURE — 85025 COMPLETE CBC W/AUTO DIFF WBC: CPT | Performed by: EMERGENCY MEDICINE

## 2025-08-20 PROCEDURE — 96374 THER/PROPH/DIAG INJ IV PUSH: CPT

## 2025-08-20 PROCEDURE — 84155 ASSAY OF PROTEIN SERUM: CPT | Performed by: EMERGENCY MEDICINE

## 2025-08-20 PROCEDURE — 99284 EMERGENCY DEPT VISIT MOD MDM: CPT | Mod: 25 | Performed by: EMERGENCY MEDICINE

## 2025-08-20 PROCEDURE — 71046 X-RAY EXAM CHEST 2 VIEWS: CPT

## 2025-08-20 PROCEDURE — 36415 COLL VENOUS BLD VENIPUNCTURE: CPT | Performed by: EMERGENCY MEDICINE

## 2025-08-20 RX ORDER — DEXAMETHASONE SODIUM PHOSPHATE 10 MG/ML
10 INJECTION, SOLUTION INTRAMUSCULAR; INTRAVENOUS ONCE
Status: COMPLETED | OUTPATIENT
Start: 2025-08-20 | End: 2025-08-20

## 2025-08-20 RX ADMIN — DEXAMETHASONE SODIUM PHOSPHATE 10 MG: 10 INJECTION, SOLUTION INTRAMUSCULAR; INTRAVENOUS at 13:20

## 2025-08-20 ASSESSMENT — ACTIVITIES OF DAILY LIVING (ADL)
ADLS_ACUITY_SCORE: 41

## 2025-08-20 ASSESSMENT — COLUMBIA-SUICIDE SEVERITY RATING SCALE - C-SSRS
6. HAVE YOU EVER DONE ANYTHING, STARTED TO DO ANYTHING, OR PREPARED TO DO ANYTHING TO END YOUR LIFE?: NO
2. HAVE YOU ACTUALLY HAD ANY THOUGHTS OF KILLING YOURSELF IN THE PAST MONTH?: NO
1. IN THE PAST MONTH, HAVE YOU WISHED YOU WERE DEAD OR WISHED YOU COULD GO TO SLEEP AND NOT WAKE UP?: NO

## (undated) DEVICE — SOL WATER IRRIG 1000ML BOTTLE 2F7114

## (undated) RX ORDER — CIPROFLOXACIN 2 MG/ML
INJECTION, SOLUTION INTRAVENOUS
Status: DISPENSED
Start: 2022-03-29

## (undated) RX ORDER — CIPROFLOXACIN 2 MG/ML
INJECTION, SOLUTION INTRAVENOUS
Status: DISPENSED
Start: 2021-04-06

## (undated) RX ORDER — CIPROFLOXACIN 2 MG/ML
INJECTION, SOLUTION INTRAVENOUS
Status: DISPENSED
Start: 2022-09-21